# Patient Record
Sex: FEMALE | Race: WHITE | NOT HISPANIC OR LATINO | Employment: OTHER | URBAN - METROPOLITAN AREA
[De-identification: names, ages, dates, MRNs, and addresses within clinical notes are randomized per-mention and may not be internally consistent; named-entity substitution may affect disease eponyms.]

---

## 2023-06-19 ENCOUNTER — TRANSFERRED RECORDS (OUTPATIENT)
Dept: HEALTH INFORMATION MANAGEMENT | Facility: CLINIC | Age: 68
End: 2023-06-19

## 2024-01-25 ENCOUNTER — TELEPHONE (OUTPATIENT)
Dept: TRANSPLANT | Facility: CLINIC | Age: 69
End: 2024-01-25
Payer: COMMERCIAL

## 2024-01-25 ENCOUNTER — DOCUMENTATION ONLY (OUTPATIENT)
Dept: TRANSPLANT | Facility: CLINIC | Age: 69
End: 2024-01-25
Payer: COMMERCIAL

## 2024-01-25 NOTE — TELEPHONE ENCOUNTER
"Donor Intake Start:24Donor Intake Complete:24  Expiration Date:24  Gender:FemalePreferred Language:English  Full Name:Nadia Hart  Needed:[not answered]  Preferred Name:Nadia  Phone Number:[phone] Phone:  Contact Preference:[not answered]Best Contact Time:9am - 5pm  Emergency Contact:Radha Fofana Contact #:[phone]  Relationship to Contact:Contact is a friend  :[birth year]Age:68  Country:[facility]  Address:[address] [place] [address] [place]  [id number]  Height:5'3\"Weight:151lbs  BMI:26.7  Employment Status:RetiredHas PTO for donation?[not answered]  Occupation:[not answered]Requires Heavy Lifting?[not answered]  Education Level:Four Year DegreeMarital Status:  Exercise Routine:2-3/WeekHealth Insurance:  Yes  Blood Type:UnknownEthnicity/Race:White  Donor Type:Family Voucher Donor  Prefer Remote Donation:[not answered]  Physician:KAMI Elise [place], [place]  Motivation to donate:  My late  was the recipient of a living donor kidney and I would like to honor him and the generousity of the donor; I also know the need is great and I can do without 2 kidneys.  Living Donor Pre-Screening  [place] In [place]  Yes  Will Accept Blood Transfusions?  Yes  Has been Diagnosed with Kidney Disease?  No  Has had a Heart Attack?  No  Has Diabetes?  No  Has had Cancer?  No  Has had Kidney Stones?  No  Has ever been Pregnant?  Yes    - Is Currently Pregnant?  No    - Months Since Pregnancy?24+    - Is Currently Nursing?  No    - Gestational Diabetes?  No    - Hypertension during pregnancy?  Never  Is Planning on Pregnancy?  No  Is Taking Birth Control?  No  Has Used Tobacco  Yes    - Currently uses Tobacco?  No    - Will Stop for Surgery?N/A    - How Many Years:20    - Tobacco use (packs/cans) / Frequency:1 / Daily  Has HIV?  No  Is Currently Incarcerated?  No  Is Currently Residing in [place]  Yes  History Misc  Has " Allergies?  Yes  Allergy  penicillin, keflex  Has had Surgeries?  Yes  Surgery When  neck surgery July 11, 2023  tonsilectomy 60 years ago?  Takes Medication?  Yes  Medication Dose Frequency  citalopram 20 mg 1/day  pravastatin 20 mg 1/day  bupropion 150 mg 1/day  calcium, glucosamine, vitamins ? 1/day  Medical History  History of High BP?  Never  Has History Of CABG (bypass surgery)?  No  History of Blood Clots?  Never  History of Coronary Disease?  Never  Has Stents Implanted?  No  Has History of Chest Pain with Exercise?  No  Has History of Chest Pain at Other Times?  No  Results of Climbing 2 Flights of Stairs?No Problem  Has had Stress Test within Last Year?  No  Has had Stroke?  No  Has had Leg Bypass?  No  History of Lung Disease?  Never  History of COPD?  Never  History of TB?  Never    - Is TB Active?[not answered]  History of Pneumonia?  Never  Has Respiratory Issues?  No  Has Gastro Issues?  No  History of Gallstones?  Never  History of Pancreatitis?  Never  History of Liver Disease?  Never  History of Hepatitis B?  Never    - Is Hep B Active?[not answered]  History of Hepatitis C?  Never  History of Bleeding Problem?  Never  History of UTIs?  No  History of Kidney Damage?  Never  History of Proteinuria?  Never  History of Hematuria?  Never  History of Neuro Disease?  Never  History of Seizure?  Never  History of Lupus?  Never  History of Paralysis?  Never  History of Arthritis?  Never  History of Neuropathy?  Never  History of Depression?  Still being treated  History of Anxiety?  Never  History of Documented Psychiatric Illness?  Never  History of Fibroid Uterus?  Never  History of Endometriosis?  Never  History of Polycystic Ovaries?  Never  Has had Miscarriages?  No  Has had Abortions?  Yes    - # of Abortions:1  Has had Transfusions?  No  History of Obesity?  No  History of Fabry's Disease?  No  History of Sickle Cell Disease?  No  History of Sickle Cell Trait?  No  History of  Sarcoidosis?  No  History of Auto-Immune Disease  No  Has had Physical Exam?  Yes    - how many years ago:1  Has had Mammogram?  Yes    - how many years ago:1  Has had Pap Smear?  Yes    - how many years ago:3  Has had Colonoscopy?  Yes    - How Many Years Ago:6  Medical History Comments?Can't think of any just now. Thanks.  Living Donor Family Medical History  Anyone with kidney disease?  No  Anyone with liver disease?  No  Anyone with heart disease?  Yes    - which family members:Mother  Anyone with coronary artery disease?  No  Anyone with high blood pressure?  No  Anyone with blood disorder?  No  Anyone with cancer?  Yes    - which family members:maternal aunt  Anyone with kidney cancer?  No  Anyone with diabetes?  No  Is mother alive?  No  Mother's age?88  Mother's cause of death?congestive heart failure  Is father alive?  No  Father's age?98  Father's cause of death?sepsis  How many siblings?3  How many adult children?0  How many children under 18?0  Social History  Has Used Alcohol?  Yes    - currently uses alcohol:  No    - how much:4/Weekly  Has Abused Alcohol?  No  Has Used Drugs?  Yes  Drugs UsedLast UsedGot Treatment?  [id number] years  No     Has had legal issues w/ law enforcement?  No  Traveled over 100 miles from home in last year?  Yes    - Traveled Where?in state  Has had suicidal thoughts or attempts in the last five years?  No

## 2024-01-29 ENCOUNTER — TELEPHONE (OUTPATIENT)
Dept: TRANSPLANT | Facility: CLINIC | Age: 69
End: 2024-01-29
Payer: COMMERCIAL

## 2024-01-29 NOTE — TELEPHONE ENCOUNTER
Initial Independent Living Donor Advocate contact made with potential donor today.  I introduced myself and my role during the donation process, including:   VIKY ROLE   The federal government requires that all licensed transplant centers provide the living donor with an Independent Living Donor Advocate (VIKY).  I do not meet recipients or attend meetings that discuss their care or decision to transplant them. My role is separate to avoid any conflict of interest.  My role is to ensure:  1) your rights are protected;  2) you get all the information you need from the transplant team to make a fully informed decision whether to donate;   3) that living donation is in your best interest.   4) that you have the right to decide NOT to go forward with living donation at any time during this process.  I am available to you throughout the workup, during surgery phase and follow-up at home.     WORKUP & PRIVACY   Your identity and workup are not shared with the recipient at any time.   The recipient's insurance covers the medical expenses related to the donor evaluation and surgery.  However, it is important that you carry your own health insurance to address any medical issues that are found and are NOT related to living donation.  Additionally, age appropriate cancer screening (I.e. mammograms,  colonoscopies, etc) is required and would be through your insurance.  There is a psychosocial and medical donor workup that consists of testing to determine if you are healthy enough to donate. Workup tests include tissue typing/genetics, many blood draws, urine collection/ (kidney function testing), chest x-ray, EKG/other heart testing, CT scan. Age appropriate cancer screening is required and would be through your insurance. As you complete each step then you may move on to the next.  Workup can take as little or as long as you need and you can stop the process at any time. Transplant is a treatment option, not a cure. A kidney  from a living kidney donor can last 12-14 years.  Other treatment options are  donation and two types of dialysis.   This is major surgery and your estimated hospital stay is approximately 1-2 nights.  After surgery, there are driving and lifting restrictions - no driving for two weeks and no lifting over ten pounds for 8 weeks.  Donors are routinely off from work for 4 - 6 weeks after surgery, and potentially longer if they have a physical job.     If you anticipate lost wages due to donation, donor wage reimbursement options may be available to you and will be reviewed with you during the evaluation process. Donor Shield and NLDAC explained.  We reviewed the importance of completing follow-up labs and surveys at six months, 1 year and 2 years after donation to monitor kidney health and the impact donation has had on their life post donation.        QUESTIONS    Have you received the Welcome e-mail that includes copies of the informed consent, financial letter, information on donor shield and NLDAC from the transplant department? Yes.    Have you discussed with anyone your potential decision to donate?   Yes. Mentioned it to a few people. All supportive     Is anyone pressuring or coercing you to donate? No.    Have you discussed any financial arrangements with recipient around donating a kidney? No.    Are you aware that you can confidentially opt out at any time, up to and including day of donation? Yes.    At this time, would you like to proceed with the medical evaluation to see if you can be a kidney donor?  Yes.    If yes, I will make an appointment for your donor coordinator to reach out to you with next steps.     Contact information for VIKY's was provided Yes.    Lety Villarreal- [phone]  Bobbi Robles-  [phone]    Confirmed that she reviewed Informed consent document and all questions answered.  Reviewed that they will receive Docusign to obtain electronic signature for the following:  Informed consent, SRTR data, BRIAN for medical information, Auth for Electronic communication, Kidney for Life and will need their signed consent back before proceeding with evaluation.     Time frame for donation: open  Paired exchange was introduced  Yes.      MyChart was initiated  Yes.  CareEverywhere was initiated Yes.    VIKY NOTES: Nadia wants to be a NDD. Lives in [facility] with 2 cats.  passed away 3 years ago and had had a kidney transplant from a living donor (her cousin) and she wants to donate in his honor. She is retired. Has Medicare. Scheduled to talk to Antonieta SWANSONAbdoulaye On 2/6 at 9am.     Bobbi Robles, [facility], CCTSW   Independent Living Donor Advocate  [facility], [facility], [facility]  Direct: [phone]  E-Mail: [email or web address]      Duration of call 30 minutes

## 2024-02-06 ENCOUNTER — DOCUMENTATION ONLY (OUTPATIENT)
Dept: TRANSPLANT | Facility: CLINIC | Age: 69
End: 2024-02-06
Payer: COMMERCIAL

## 2024-02-06 ENCOUNTER — TELEPHONE (OUTPATIENT)
Dept: TRANSPLANT | Facility: CLINIC | Age: 69
End: 2024-02-06
Payer: COMMERCIAL

## 2024-02-06 DIAGNOSIS — Z00.5 TRANSPLANT DONOR EVALUATION: Primary | ICD-10-CM

## 2024-02-06 NOTE — TELEPHONE ENCOUNTER
Contacted Nadia Allan to introduce myself and my role, review of medical/surgical/family history and next steps.     Nadia Allan  is aware She can stop donor evaluation at any time.    Regular blood donor? Yes Last blood donation date years ago (Notified donor to avoid blood donation at this time to get accurate blood counts if going through evaluation)     Nadia Allan is a 69 year old female  ABO  ? at would like to learn more about NDD. Open to paired exchange: yes     Concerns from medical/surgical/family history: had neck surgery 6 months ago for pinched nerve    Current medications and NSAID use:     Allergies reviewed: penicillin, keflex    Legal issues w/ law enforcement: n    Reviewed any history of travel in endemic areas: [place], no  Strongyloides- [place], [place] and [place].  Trypanosoma cruzi ([place])- [place]  West Nile Virus- [place], [place], [place], [place] and [place]. (Included in WALT testing prior to donation)    Per our Phase 1 algorithm, does meet criteria to do preliminary testing. Social work screening no.      Verified that potential donor was provided the informed consent DocuSign and they are comfortable moving forward to living donor evaluation.    Reviewed evaluation testing: Iohexol, Lab work, CXR, EKG, Provider visits and functions, CT Angiogram.     Reviewed operations of selection committee and angio review meetings and the need for multidisciplinary input. Post-donation requirements include post-op appointment with your surgeon at 2 weeks after surgery, 6 week, 6 month, 1 year and 2 year lab tests.      I Reviewed NKR listing and transfer of care to KPD team if approved. Provided Vernell with NKR website to review.     I Briefly went over options if approved of NDD and voucher donation.     I would like to proceed with next steps: phase 1I     Encouraged sign up for Offerboxxhart and reviewed importance of watching teaching videos prior  to evaluation.    I Verified recipient status if NDD.    Donor timeline: no time line     Will send orders to scheduling team to set up for phase 1 testing.

## 2024-02-16 ENCOUNTER — TRANSFERRED RECORDS (OUTPATIENT)
Dept: HEALTH INFORMATION MANAGEMENT | Facility: CLINIC | Age: 69
End: 2024-02-16
Payer: COMMERCIAL

## 2024-02-22 ENCOUNTER — DOCUMENTATION ONLY (OUTPATIENT)
Dept: TRANSPLANT | Facility: CLINIC | Age: 69
End: 2024-02-22
Payer: COMMERCIAL

## 2024-02-22 NOTE — PROGRESS NOTES
Reviewed phase 1 testing. Labs look good but blood pressure elevated. Let Nadia know we will have her complete a 24hr blood pressure before deciding if she can come for evaluation. Nadia is in agreement with plan. 24hr blood pressure ordered.

## 2024-02-27 ENCOUNTER — TRANSFERRED RECORDS (OUTPATIENT)
Dept: HEALTH INFORMATION MANAGEMENT | Facility: CLINIC | Age: 69
End: 2024-02-27
Payer: COMMERCIAL

## 2024-03-04 DIAGNOSIS — Z00.5 TRANSPLANT DONOR EVALUATION: Primary | ICD-10-CM

## 2024-03-12 DIAGNOSIS — Z00.5 TRANSPLANT DONOR EVALUATION: Primary | ICD-10-CM

## 2024-03-12 RX ORDER — EPINEPHRINE 1 MG/ML
0.3 INJECTION, SOLUTION, CONCENTRATE INTRAVENOUS EVERY 5 MIN PRN
Status: CANCELLED | OUTPATIENT
Start: 2024-03-14

## 2024-03-12 RX ORDER — ALBUTEROL SULFATE 90 UG/1
1-2 AEROSOL, METERED RESPIRATORY (INHALATION)
Status: CANCELLED
Start: 2024-03-14

## 2024-03-12 RX ORDER — MEPERIDINE HYDROCHLORIDE 25 MG/ML
25 INJECTION INTRAMUSCULAR; INTRAVENOUS; SUBCUTANEOUS EVERY 30 MIN PRN
Status: CANCELLED | OUTPATIENT
Start: 2024-03-14

## 2024-03-12 RX ORDER — ALBUTEROL SULFATE 0.83 MG/ML
2.5 SOLUTION RESPIRATORY (INHALATION)
Status: CANCELLED | OUTPATIENT
Start: 2024-03-14

## 2024-03-12 RX ORDER — DIPHENHYDRAMINE HYDROCHLORIDE 50 MG/ML
50 INJECTION INTRAMUSCULAR; INTRAVENOUS
Status: CANCELLED
Start: 2024-03-14

## 2024-03-13 DIAGNOSIS — Z00.5 TRANSPLANT DONOR EVALUATION: ICD-10-CM

## 2024-03-13 LAB
A1 AG RBC QL: POSITIVE
ABO/RH(D): NORMAL
ANTIBODY SCREEN: NEGATIVE
SPECIMEN EXPIRATION DATE: NORMAL
SPECIMEN EXPIRATION DATE: NORMAL

## 2024-03-14 ENCOUNTER — HOSPITAL ENCOUNTER (OUTPATIENT)
Dept: CT IMAGING | Facility: CLINIC | Age: 69
Discharge: HOME | End: 2024-03-14
Attending: INTERNAL MEDICINE

## 2024-03-14 ENCOUNTER — ALLIED HEALTH/NURSE VISIT (OUTPATIENT)
Dept: TRANSPLANT | Facility: CLINIC | Age: 69
End: 2024-03-14
Attending: INTERNAL MEDICINE

## 2024-03-14 ENCOUNTER — OFFICE VISIT (OUTPATIENT)
Dept: TRANSPLANT | Facility: CLINIC | Age: 69
End: 2024-03-14
Attending: INTERNAL MEDICINE

## 2024-03-14 ENCOUNTER — LAB (OUTPATIENT)
Dept: LAB | Facility: CLINIC | Age: 69
End: 2024-03-14

## 2024-03-14 ENCOUNTER — OFFICE VISIT (OUTPATIENT)
Dept: INFUSION THERAPY | Facility: CLINIC | Age: 69
End: 2024-03-14
Attending: INTERNAL MEDICINE

## 2024-03-14 ENCOUNTER — LAB (OUTPATIENT)
Dept: LAB | Facility: CLINIC | Age: 69
End: 2024-03-14
Attending: INTERNAL MEDICINE

## 2024-03-14 ENCOUNTER — HOSPITAL ENCOUNTER (OUTPATIENT)
Dept: CARDIOLOGY | Facility: CLINIC | Age: 69
Discharge: HOME | End: 2024-03-14
Attending: INTERNAL MEDICINE

## 2024-03-14 ENCOUNTER — HOSPITAL ENCOUNTER (OUTPATIENT)
Dept: GENERAL RADIOLOGY | Facility: CLINIC | Age: 69
Discharge: HOME | End: 2024-03-14
Attending: INTERNAL MEDICINE

## 2024-03-14 VITALS
HEART RATE: 93 BPM | SYSTOLIC BLOOD PRESSURE: 102 MMHG | HEIGHT: 62 IN | WEIGHT: 148.15 LBS | OXYGEN SATURATION: 94 % | DIASTOLIC BLOOD PRESSURE: 68 MMHG | TEMPERATURE: 97.7 F | BODY MASS INDEX: 27.26 KG/M2

## 2024-03-14 VITALS
RESPIRATION RATE: 18 BRPM | DIASTOLIC BLOOD PRESSURE: 81 MMHG | TEMPERATURE: 97.8 F | SYSTOLIC BLOOD PRESSURE: 121 MMHG | HEART RATE: 89 BPM | WEIGHT: 148.1 LBS | OXYGEN SATURATION: 98 %

## 2024-03-14 DIAGNOSIS — Z00.5 TRANSPLANT DONOR EVALUATION: ICD-10-CM

## 2024-03-14 DIAGNOSIS — Z00.5 TRANSPLANT DONOR EVALUATION: Primary | ICD-10-CM

## 2024-03-14 DIAGNOSIS — M47.812 SPONDYLOSIS OF CERVICAL REGION WITHOUT MYELOPATHY OR RADICULOPATHY: ICD-10-CM

## 2024-03-14 DIAGNOSIS — F41.9 ANXIETY AND DEPRESSION: Primary | ICD-10-CM

## 2024-03-14 DIAGNOSIS — F32.A ANXIETY AND DEPRESSION: Primary | ICD-10-CM

## 2024-03-14 DIAGNOSIS — I05.1 RHEUMATIC MITRAL REGURGITATION: ICD-10-CM

## 2024-03-14 LAB
ABO/RH(D): NORMAL
ALBUMIN MFR UR ELPH: 9.9 MG/DL
ALBUMIN SERPL BCG-MCNC: 4.5 G/DL (ref 3.5–5.2)
ALBUMIN UR-MCNC: NEGATIVE MG/DL
ALP SERPL-CCNC: 83 U/L (ref 40–150)
ALT SERPL W P-5'-P-CCNC: 22 U/L (ref 0–50)
ANION GAP SERPL CALCULATED.3IONS-SCNC: 11 MMOL/L (ref 7–15)
APPEARANCE UR: CLEAR
APTT PPP: 31 SECONDS (ref 22–38)
AST SERPL W P-5'-P-CCNC: 29 U/L (ref 0–45)
ATRIAL RATE - MUSE: 77 BPM
BILIRUB SERPL-MCNC: 0.5 MG/DL
BILIRUB UR QL STRIP: NEGATIVE
BUN SERPL-MCNC: 18.9 MG/DL (ref 8–23)
CALCIUM SERPL-MCNC: 9.7 MG/DL (ref 8.8–10.2)
CHLORIDE SERPL-SCNC: 101 MMOL/L (ref 98–107)
CHOLEST SERPL-MCNC: 180 MG/DL
CMV IGG SERPL IA-ACNC: 9.2 U/ML
CMV IGG SERPL IA-ACNC: ABNORMAL
COLOR UR AUTO: YELLOW
CREAT SERPL-MCNC: 0.92 MG/DL (ref 0.51–0.95)
CREAT UR-MCNC: 101 MG/DL
CREAT UR-MCNC: 102 MG/DL
DEPRECATED HCO3 PLAS-SCNC: 24 MMOL/L (ref 22–29)
DIASTOLIC BLOOD PRESSURE - MUSE: NORMAL MMHG
EBV VCA IGG SER IA-ACNC: >750 U/ML
EBV VCA IGG SER IA-ACNC: POSITIVE
EGFRCR SERPLBLD CKD-EPI 2021: 66 ML/MIN/1.73M2
ERYTHROCYTE [DISTWIDTH] IN BLOOD BY AUTOMATED COUNT: 13.2 % (ref 10–15)
FASTING STATUS PATIENT QL REPORTED: YES
GLUCOSE SERPL-MCNC: 92 MG/DL (ref 70–99)
GLUCOSE UR STRIP-MCNC: NEGATIVE MG/DL
HBA1C MFR BLD: 5.6 %
HBV CORE AB SERPL QL IA: NONREACTIVE
HBV SURFACE AB SERPL IA-ACNC: 219 M[IU]/ML
HBV SURFACE AB SERPL IA-ACNC: REACTIVE M[IU]/ML
HCT VFR BLD AUTO: 43.2 % (ref 35–47)
HDLC SERPL-MCNC: 59 MG/DL
HGB BLD-MCNC: 14.8 G/DL (ref 11.7–15.7)
HGB UR QL STRIP: NEGATIVE
HIV 1+2 AB+HIV1 P24 AG SERPL QL IA: NONREACTIVE
INR PPP: 1.02 (ref 0.85–1.15)
INTERPRETATION ECG - MUSE: NORMAL
KETONES UR STRIP-MCNC: NEGATIVE MG/DL
LDLC SERPL CALC-MCNC: 105 MG/DL
LEUKOCYTE ESTERASE UR QL STRIP: ABNORMAL
MCH RBC QN AUTO: 29.7 PG (ref 26.5–33)
MCHC RBC AUTO-ENTMCNC: 34.3 G/DL (ref 31.5–36.5)
MCV RBC AUTO: 87 FL (ref 78–100)
MICROALBUMIN UR-MCNC: <12 MG/L
MICROALBUMIN/CREAT UR: NORMAL MG/G{CREAT}
MUCOUS THREADS #/AREA URNS LPF: PRESENT /LPF
NITRATE UR QL: NEGATIVE
NONHDLC SERPL-MCNC: 121 MG/DL
P AXIS - MUSE: 30 DEGREES
PH UR STRIP: 5.5 [PH] (ref 5–7)
PHOSPHATE SERPL-MCNC: 3.4 MG/DL (ref 2.5–4.5)
PLATELET # BLD AUTO: 260 10E3/UL (ref 150–450)
POTASSIUM SERPL-SCNC: 4.2 MMOL/L (ref 3.4–5.3)
PR INTERVAL - MUSE: 174 MS
PROT SERPL-MCNC: 7.5 G/DL (ref 6.4–8.3)
PROT/CREAT 24H UR: 0.1 MG/MG CR (ref 0–0.2)
QRS DURATION - MUSE: 82 MS
QT - MUSE: 380 MS
QTC - MUSE: 430 MS
R AXIS - MUSE: 25 DEGREES
RBC # BLD AUTO: 4.98 10E6/UL (ref 3.8–5.2)
RBC URINE: 1 /HPF
SODIUM SERPL-SCNC: 136 MMOL/L (ref 135–145)
SP GR UR STRIP: 1.02 (ref 1–1.03)
SPECIMEN EXPIRATION DATE: NORMAL
SQUAMOUS EPITHELIAL: 3 /HPF
SYSTOLIC BLOOD PRESSURE - MUSE: NORMAL MMHG
T AXIS - MUSE: 28 DEGREES
T PALLIDUM AB SER QL: NONREACTIVE
TRIGL SERPL-MCNC: 78 MG/DL
URATE SERPL-MCNC: 4.7 MG/DL (ref 2.4–5.7)
UROBILINOGEN UR STRIP-MCNC: NORMAL MG/DL
VENTRICULAR RATE- MUSE: 77 BPM
WBC # BLD AUTO: 6.4 10E3/UL (ref 4–11)
WBC URINE: 8 /HPF

## 2024-03-14 PROCEDURE — 250N000011 HC RX IP 250 OP 636: Performed by: INTERNAL MEDICINE

## 2024-03-14 PROCEDURE — 86706 HEP B SURFACE ANTIBODY: CPT | Performed by: INTERNAL MEDICINE

## 2024-03-14 PROCEDURE — 36415 COLL VENOUS BLD VENIPUNCTURE: CPT

## 2024-03-14 PROCEDURE — 93325 DOPPLER ECHO COLOR FLOW MAPG: CPT | Mod: 26 | Performed by: INTERNAL MEDICINE

## 2024-03-14 PROCEDURE — 99207 PR NO CHARGE COORDINATED CARE PS: CPT

## 2024-03-14 PROCEDURE — 93321 DOPPLER ECHO F-UP/LMTD STD: CPT | Mod: 26 | Performed by: INTERNAL MEDICINE

## 2024-03-14 PROCEDURE — 82542 COL CHROMOTOGRAPHY QUAL/QUAN: CPT | Performed by: DIETITIAN, REGISTERED

## 2024-03-14 PROCEDURE — 86481 TB AG RESPONSE T-CELL SUSP: CPT | Performed by: INTERNAL MEDICINE

## 2024-03-14 PROCEDURE — 86780 TREPONEMA PALLIDUM: CPT | Performed by: INTERNAL MEDICINE

## 2024-03-14 PROCEDURE — 86704 HEP B CORE ANTIBODY TOTAL: CPT | Performed by: INTERNAL MEDICINE

## 2024-03-14 PROCEDURE — 84100 ASSAY OF PHOSPHORUS: CPT

## 2024-03-14 PROCEDURE — 84156 ASSAY OF PROTEIN URINE: CPT

## 2024-03-14 PROCEDURE — 71046 X-RAY EXAM CHEST 2 VIEWS: CPT | Mod: 26 | Performed by: RADIOLOGY

## 2024-03-14 PROCEDURE — 83036 HEMOGLOBIN GLYCOSYLATED A1C: CPT | Performed by: INTERNAL MEDICINE

## 2024-03-14 PROCEDURE — 36415 COLL VENOUS BLD VENIPUNCTURE: CPT | Performed by: TRANSPLANT SURGERY

## 2024-03-14 PROCEDURE — 81378 HLA I & II TYPING HR: CPT

## 2024-03-14 PROCEDURE — 80061 LIPID PANEL: CPT

## 2024-03-14 PROCEDURE — 36415 COLL VENOUS BLD VENIPUNCTURE: CPT | Performed by: DIETITIAN, REGISTERED

## 2024-03-14 PROCEDURE — 99214 OFFICE O/P EST MOD 30 MIN: CPT | Performed by: TRANSPLANT SURGERY

## 2024-03-14 PROCEDURE — 85730 THROMBOPLASTIN TIME PARTIAL: CPT

## 2024-03-14 PROCEDURE — 86644 CMV ANTIBODY: CPT | Performed by: INTERNAL MEDICINE

## 2024-03-14 PROCEDURE — 74175 CTA ABDOMEN W/CONTRAST: CPT

## 2024-03-14 PROCEDURE — 85027 COMPLETE CBC AUTOMATED: CPT

## 2024-03-14 PROCEDURE — 86665 EPSTEIN-BARR CAPSID VCA: CPT | Performed by: INTERNAL MEDICINE

## 2024-03-14 PROCEDURE — 86900 BLOOD TYPING SEROLOGIC ABO: CPT

## 2024-03-14 PROCEDURE — 81001 URINALYSIS AUTO W/SCOPE: CPT

## 2024-03-14 PROCEDURE — 99205 OFFICE O/P NEW HI 60 MIN: CPT | Performed by: INTERNAL MEDICINE

## 2024-03-14 PROCEDURE — 71046 X-RAY EXAM CHEST 2 VIEWS: CPT

## 2024-03-14 PROCEDURE — 93018 CV STRESS TEST I&R ONLY: CPT | Performed by: INTERNAL MEDICINE

## 2024-03-14 PROCEDURE — 255N000002 HC RX 255 OP 636: Performed by: INTERNAL MEDICINE

## 2024-03-14 PROCEDURE — 86850 RBC ANTIBODY SCREEN: CPT | Performed by: INTERNAL MEDICINE

## 2024-03-14 PROCEDURE — 82570 ASSAY OF URINE CREATININE: CPT | Performed by: INTERNAL MEDICINE

## 2024-03-14 PROCEDURE — 84155 ASSAY OF PROTEIN SERUM: CPT

## 2024-03-14 PROCEDURE — 86905 BLOOD TYPING RBC ANTIGENS: CPT | Performed by: INTERNAL MEDICINE

## 2024-03-14 PROCEDURE — G0463 HOSPITAL OUTPT CLINIC VISIT: HCPCS | Mod: 25 | Performed by: TRANSPLANT SURGERY

## 2024-03-14 PROCEDURE — 86803 HEPATITIS C AB TEST: CPT | Performed by: INTERNAL MEDICINE

## 2024-03-14 PROCEDURE — 999N000208 ECHO STRESS ECHOCARDIOGRAM

## 2024-03-14 PROCEDURE — 82043 UR ALBUMIN QUANTITATIVE: CPT | Performed by: INTERNAL MEDICINE

## 2024-03-14 PROCEDURE — 86788 WEST NILE VIRUS AB IGM: CPT

## 2024-03-14 PROCEDURE — 93016 CV STRESS TEST SUPVJ ONLY: CPT | Performed by: INTERNAL MEDICINE

## 2024-03-14 PROCEDURE — 84550 ASSAY OF BLOOD/URIC ACID: CPT

## 2024-03-14 PROCEDURE — 82374 ASSAY BLOOD CARBON DIOXIDE: CPT

## 2024-03-14 PROCEDURE — 87340 HEPATITIS B SURFACE AG IA: CPT | Performed by: INTERNAL MEDICINE

## 2024-03-14 PROCEDURE — 85610 PROTHROMBIN TIME: CPT

## 2024-03-14 PROCEDURE — 93350 STRESS TTE ONLY: CPT | Mod: 26 | Performed by: INTERNAL MEDICINE

## 2024-03-14 PROCEDURE — 74175 CTA ABDOMEN W/CONTRAST: CPT | Mod: 26 | Performed by: RADIOLOGY

## 2024-03-14 RX ORDER — DIPHENHYDRAMINE HYDROCHLORIDE 50 MG/ML
50 INJECTION INTRAMUSCULAR; INTRAVENOUS
Status: CANCELLED
Start: 2024-03-14

## 2024-03-14 RX ORDER — EPINEPHRINE 1 MG/ML
0.3 INJECTION, SOLUTION INTRAMUSCULAR; SUBCUTANEOUS EVERY 5 MIN PRN
Status: CANCELLED | OUTPATIENT
Start: 2024-03-14

## 2024-03-14 RX ORDER — IOPAMIDOL 755 MG/ML
90 INJECTION, SOLUTION INTRAVASCULAR ONCE
Status: COMPLETED | OUTPATIENT
Start: 2024-03-14 | End: 2024-03-14

## 2024-03-14 RX ORDER — CITALOPRAM HYDROBROMIDE 40 MG/1
40 TABLET ORAL EVERY MORNING
COMMUNITY
Start: 2023-05-16

## 2024-03-14 RX ORDER — IBUPROFEN 200 MG
200 TABLET ORAL EVERY 6 HOURS PRN
COMMUNITY
Start: 2023-07-18

## 2024-03-14 RX ORDER — ALBUTEROL SULFATE 0.83 MG/ML
2.5 SOLUTION RESPIRATORY (INHALATION)
Status: CANCELLED | OUTPATIENT
Start: 2024-03-14

## 2024-03-14 RX ORDER — IBUPROFEN 200 MG
500 CAPSULE ORAL DAILY
COMMUNITY

## 2024-03-14 RX ORDER — ALBUTEROL SULFATE 90 UG/1
1-2 AEROSOL, METERED RESPIRATORY (INHALATION)
Status: CANCELLED
Start: 2024-03-14

## 2024-03-14 RX ORDER — MEPERIDINE HYDROCHLORIDE 25 MG/ML
25 INJECTION INTRAMUSCULAR; INTRAVENOUS; SUBCUTANEOUS EVERY 30 MIN PRN
Status: CANCELLED | OUTPATIENT
Start: 2024-03-14

## 2024-03-14 RX ORDER — METHYLPREDNISOLONE SODIUM SUCCINATE 125 MG/2ML
125 INJECTION, POWDER, LYOPHILIZED, FOR SOLUTION INTRAMUSCULAR; INTRAVENOUS
Status: CANCELLED
Start: 2024-03-14

## 2024-03-14 RX ORDER — HYDROCORTISONE 2.5 %
CREAM (GRAM) TOPICAL PRN
COMMUNITY
Start: 2023-10-23

## 2024-03-14 RX ORDER — BUPROPION HYDROCHLORIDE 150 MG/1
450 TABLET ORAL EVERY MORNING
COMMUNITY
Start: 2024-02-16

## 2024-03-14 RX ORDER — PRAVASTATIN SODIUM 20 MG
20 TABLET ORAL DAILY
COMMUNITY
Start: 2024-02-16

## 2024-03-14 RX ADMIN — IOHEXOL 4 ML: 350 INJECTION, SOLUTION INTRAVENOUS at 09:16

## 2024-03-14 RX ADMIN — PERFLUTREN 5 ML: 6.52 INJECTION, SUSPENSION INTRAVENOUS at 15:08

## 2024-03-14 RX ADMIN — IOPAMIDOL 90 ML: 755 INJECTION, SOLUTION INTRAVENOUS at 14:22

## 2024-03-14 ASSESSMENT — PAIN SCALES - GENERAL: PAINLEVEL: NO PAIN (0)

## 2024-03-14 NOTE — LETTER
3/14/2024         RE: Nadia Allan  [address]  [place] [address]        Dear Colleague,    Thank you for referring your patient, Nadia Allan, to the [facility] TRANSPLANT [facility]. Please see a copy of my visit note below.    TRANSPLANT NEPHROLOGY DONOR EVALUATION    Assessment and Plan:  # Prospective Kidney Transplant Donor: Patient with some issues that need to be addressed prior to donation. Patient's blood pressure is acceptable at this visit, kidney function appears to be possibly low with Iohexol pending, and urinalysis is abnormal.    # Low GFR: Patient with low estimated GFR and also calculated low GFR via Iohexol, which would preclude her from donating.    # Abnormal Cardiac Stress Test: Patient with abnormal positive cardiac stress echo, as well as previous cardiac echo that suggested HFpEF with grade I diastolic dysfunction.   - Recommend Cardiology referral and probably coronary angiogram.    # Chronic Back Pain with Radiculopathy and Back Surgery: Patient with both cervical and lumbar back pain with more recent bilateral finger paraesthesia.  Imaging showed cervical spondylosis with central stenosis, now s/p C2-3 laminectomy.  She reports paraesthesia is improved, but no resolved.   - Should patient be a donor candidate, would discuss with Neurosurgery whether positioning during donor nephrectomy might be a concern.    # Asymptomatic Pyuria: Patient with 8 WBC on urinalysis, but denies any UTI signs or symptoms.   - Would repeat UA.    Discussed the risks of donating a kidney, including the surgical risk and the possible risks of living with one kidney.    Education about expected post-donation kidney function and how chronic kidney disease (CKD) and end stage kidney disease (ESKD) might potentially impact the donor in the future, include, but not limited to:       - On average, donors will have 25-35% permanent loss of kidney function at donation.       - Baseline risk  of ESKD may slightly exceed that of members of the general        population with the same demographic profile.       - Donor risks must be interpreted in light of known epidemiology of both CKD or         ESKD, such as that CKD generally develops in midlife (40-50 years old) and ESKD         generally develops after age 60.       - Medical evaluation of young potential donors cannot predict lifetime risk of CKD or         ESKD.       - Donors may be at higher risk for CKD if they sustain damage to the remaining         kidney.       - Development of CKD and progression of ESKD may be more rapid with only 1         kidney.       - Some type of kidney replacement therapy, either kidney transplant or dialysis, is         required when reaching ESKD.    Potential medical or surgical risks include, but not limited to:       - Death.       - Scars, pain, fatigue, and other consequences typical of any surgical procedure.       - Decreased kidney function.       - Abdominal or bowel symptoms, such as bloating and nausea, and developing         bowel obstruction.       - Kidney failure (ESKD) and the need for a kidney transplant or dialysis for the donor.       - Impact of obesity, hypertension, or other donor-specific medical conditions on         morbidity and mortality of the potential donor.    Patients overall evaluation will be discussed with the transplant team and a final recommendation on the patients' suitability to be a kidney transplant donor will be made at that time.    Consult:  Nadia Allan was seen in consultation at the request of Dr. Jose De Jesus Miller for evaluation as a potential kidney transplant donor.    Reason for Visit:  Nadia Allan is a 69 year old female who presents for a kidney donor evaluation.  Patient would like to be a non-directed donor.    Present Condition and Donor-Related Medical History:    Patient reports her  had a kidney transplant in 2008 and did well for many  years, for which she is grateful.  Her  has since , but she wants to give back and be a non directed kidney donor.    Energy level is okay, but it comes and goes at times and she can have a hard time getting motivated.  She is active and does get some exercise.  Denies any chest pain or shortness of breath with exertion.  Appetite is good and no recent weight change.  No nausea, vomiting or diarrhea.  No fever, sweats or chills.  No leg swelling.  No pain or burning with urination.    She does have a h/o bilateral finger numbness.  This was worked up with imaging showing cervical spondylosis with central stenosis.  She is s/p C2-3 laminectomy 2023 with improvement in her numbness, but not resolved.    Patient also has a h/o rheumatic fever and now with mitral valve regurgitation.  This has reportedly been stable on cardiac echo, although her last echo 2023 did show grade I diastolic dysfunction.         Kidney Disease Hx:       h/o Kidney Problems: No   Family h/o Genetic Kidney Disease: No       h/o Hypertension: No      Usual Blood Pressure: Not checked       h/o Protein in Urine: No    h/o Blood in Urine: No       h/o Kidney Stones: No     h/o Kidney Injury: No       h/o Recurrent UTI: No   h/o Genitourinary Problems: No       h/o Chronic NSAID Use: No         Other Medical Hx:       h/o Diabetes: No             h/o Gastrointestinal, Pancreas or Liver Problems: No       h/o Lung or Heart Problems: Yes: H/o mitral valve regurgitation       h/o Hematologic Problems: No  h/o Bleeding or Clotting Problems: No       h/o Cancer: No       h/o Infection Problems: No       H/o Gestational DM: Not applicable      H/o Gestational HTN: Not applicable     H/o Preeclampsia: Not applicable         Skin Cancer Risk:       h/o more than 50 moles: No       h/o extensive sun exposure: No       h/o melanoma: No       Family h/o melanoma: No         Mental Health Assessment:       h/o Depression: Yes: Stable with  management by PCP       h/o Psychiatric Illness: No       h/o Suicidal Attempt(s): No    COVID Status:  Vaccination Up To Date: Yes  H/o COVID Infection: Yes; with no residual symptoms    Review Of Systems:   A comprehensive review of systems was obtained and negative, except as noted in the HPI or PMH.    Past Medical History:   History was taken from the patient as noted below.  Past Medical History:   Diagnosis Date     (HFpEF) heart failure with preserved ejection fraction (H)      Anxiety and depression      Arthritis      Chronic cough     Felt secondary to laryngeal/pharyngeal reflux     Dyslipidemia      Insomnia      Lumbar spondylosis      Mitral valve regurgitation      Osteopenia      Rheumatic fever      Spondylosis of cervical region without myelopathy or radiculopathy     Bilateral parasthesia in fingers, s/p C2-3 laminectomy       Past Social History:   Past Surgical History:   Procedure Laterality Date     BUNIONECTOMY Right      CERVICAL LAMINECTOMY      C2-3     FACELIFT       TONSILLECTOMY       WISDOM TOOTH EXTRACTION       Personal or family history of anesthesia problems: No    Family History:   Family History   Problem Relation Age of Onset     Coronary Artery Disease Mother      Cerebrovascular Disease Mother      Crohn's Disease Brother      Breast Cancer Maternal Aunt           Specific Family History in First Degree Relatives:       FH of Kidney Dz: No  FH of Diabetes: No       FH of Hypertension: No FH of CAD: Yes        FH of Cancer: No  FH of Kidney Cancer: No    Personal History:   Social History     Socioeconomic History     Marital status:      Spouse name: Not on file     Number of children: 0     Years of education: Not on file     Highest education level: Not on file   Occupational History     Not on file   Tobacco Use     Smoking status: Never     Smokeless tobacco: Never   Substance and Sexual Activity     Alcohol use: Yes     Alcohol/week: 1.0 standard drink of alcohol      Types: 1 Glasses of wine per week     Comment: 1 glass of wine     Drug use: Not Currently     Sexual activity: Not on file   Other Topics Concern     Not on file   Social History Narrative     Not on file     Social Determinants of Health     Financial Resource Strain: Not on file   Food Insecurity: Not on file   Transportation Needs: Not on file   Physical Activity: Not on file   Stress: Not on file   Social Connections: Not on file   Interpersonal Safety: Not on file   Housing Stability: Not on file          Specific Social History:       Health Insurance Status: Yes       Employment Status: Retired  Occupation: [employer]                       Living Arrangements: lives alone       Social Support: Yes       Presence of increased risk for disease transmission behaviors as defined by PHS guidelines: No        Allergies:  Allergies   Allergen Reactions     Keflex [Cephalexin] Unknown     Penicillins Unknown       Medications:  Current Outpatient Medications   Medication Sig     buPROPion (WELLBUTRIN XL) 150 MG 24 hr tablet Take 450 mg by mouth every morning     calcium carbonate 500 mg, elemental, (OSCAL 500) 1250 (500 Ca) MG TABS tablet Take 500 mg by mouth daily     citalopram (CELEXA) 40 MG tablet Take 40 mg by mouth every morning     Glucos-Chondroit-Hyaluron-MSM (GLUCOSAMINE CHONDROITIN JOINT PO) Take 1 tablet by mouth 2 times daily     hydrocortisone 2.5 % cream Apply topically as needed for rash     ibuprofen (ADVIL/MOTRIN) 200 MG tablet Take 200 mg by mouth every 6 hours as needed     Multiple Vitamin (MULTI VITAMIN DAILY PO) Take 1 tablet by mouth daily     pravastatin (PRAVACHOL) 20 MG tablet Take 20 mg by mouth daily     No current facility-administered medications for this visit.     There are no discontinued medications.      Vitals:      3/14/2024     9:34 AM 3/14/2024     9:35 AM 3/14/2024     9:36 AM   Vital Signs   Systolic 105 95 102   Diastolic 71 65 68       Exam:   GENERAL APPEARANCE: alert  and no distress  HENT: mouth without ulcers or lesions  RESP: lungs clear to auscultation - no rales, rhonchi or wheezes  CV: regular rhythm, normal rate, no rub, 2-6 systolic murmur and split S1S2  EDEMA: no LE edema bilaterally  ABDOMEN: soft, nondistended, nontender, bowel sounds normal  MS: extremities normal - no gross deformities noted, no evidence of inflammation in joints, no muscle tenderness  SKIN: no rash    Results:   Labs and imaging were ordered for this visit and reviewed by me.  Recent Results (from the past 336 hour(s))   EKG 12-lead complete w/read - Clinics    Collection Time: 03/14/24  7:28 AM   Result Value Ref Range    Systolic Blood Pressure  mmHg    Diastolic Blood Pressure  mmHg    Ventricular Rate 77 BPM    Atrial Rate 77 BPM    NC Interval 174 ms    QRS Duration 82 ms     ms    QTc 430 ms    P Axis 30 degrees    R AXIS 25 degrees    T Axis 28 degrees    Interpretation ECG       Sinus rhythm  Normal ECG  No previous ECGs available     EBV Capsid Antibody IgM    Collection Time: 03/14/24  8:53 AM   Result Value Ref Range    EBV Capsid Liliya IgM Instrument Value <10.0 <36.0 U/mL    EBV Capsid Antibody IgM No detectable antibody. No detectable antibody.   EBV Capsid Antibody IgG    Collection Time: 03/14/24  8:53 AM   Result Value Ref Range    EBV Capsid Liliya IgG Instrument Value >750.0 (H) <18.0 U/mL    EBV Capsid Antibody IgG Positive (A) No detectable antibody.   CMV Antibody IgG    Collection Time: 03/14/24  8:53 AM   Result Value Ref Range    CMV Liliya IgG Instrument Value 9.20 (H) <0.60 U/mL    CMV Antibody IgG Positive, suggests recent or past exposure. (A) No detectable antibody.    ABO Subtyping [[id number]]    Collection Time: 03/14/24  8:53 AM   Result Value Ref Range    A1 Antigen Type Positive     SPECIMEN EXPIRATION DATE [id number]    [place] Virus Antibody IgG IgM    Collection Time: 03/14/24  8:53 AM   Result Value Ref Range    [place] IgG Serum 0.52 <=1.29 IV    [place] IgM Serum 0.00 <=0.89 IV   Treponema Abs w Reflex to RPR and Titer    Collection Time: 03/14/24  8:53 AM   Result Value Ref Range    Treponema Antibody Total Nonreactive Nonreactive   HIV Antigen Antibody Combo Pretransplant Cascade    Collection Time: 03/14/24  8:53 AM   Result Value Ref Range    HIV Antigen Antibody Combo Pretransplant Nonreactive Nonreactive   Hepatitis C antibody    Collection Time: 03/14/24  8:53 AM   Result Value Ref Range    Hepatitis C Antibody Nonreactive Nonreactive   Hepatitis B surface antigen    Collection Time: 03/14/24  8:53 AM   Result Value Ref Range    Hepatitis B Surface Antigen Nonreactive Nonreactive   Hepatitis B Surface Antibody    Collection Time: 03/14/24  8:53 AM   Result Value Ref Range    Hepatitis B Surface Antibody Reactive     Hepatitis B Surface Antibody Instrument Value 219.00 <8.5 m[IU]/mL   Hepatitis B core antibody    Collection Time: 03/14/24  8:53 AM   Result Value Ref Range    Hepatitis B Core Antibody Total Nonreactive Nonreactive   Protein  random urine    Collection Time: 03/14/24  8:53 AM   Result Value Ref Range    Total Protein Urine mg/dL 9.9   mg/dL    Total Protein Urine mg/mg Creat 0.10 0.00 - 0.20 mg/mg Cr    Creatinine Urine mg/dL 101.0 mg/dL   Albumin Random Urine Quantitative with Creat Ratio    Collection Time: 03/14/24  8:53 AM   Result Value Ref Range    Creatinine Urine mg/dL 102.0 mg/dL    Albumin Urine mg/L <12.0 mg/L    Albumin Urine mg/g Cr     Routine UA with microscopic    Collection Time: 03/14/24  8:53 AM   Result Value Ref Range    Color Urine Yellow Colorless, Straw, Light Yellow, Yellow    Appearance Urine Clear Clear    Glucose Urine Negative Negative mg/dL    Bilirubin Urine Negative Negative    Ketones Urine Negative Negative mg/dL    Specific Gravity Urine 1.018 1.003 - 1.035    Blood Urine Negative Negative    pH Urine 5.5 5.0 - 7.0    Protein Albumin Urine Negative Negative mg/dL    Urobilinogen Urine Normal Normal, 2.0  mg/dL    Nitrite Urine Negative Negative    Leukocyte Esterase Urine Small (A) Negative    Mucus Urine Present (A) None Seen /LPF    RBC Urine 1 <=2 /HPF    WBC Urine 8 (H) <=5 /HPF    Squamous Epithelials Urine 3 (H) <=1 /HPF   CBC with platelets    Collection Time: 03/14/24  8:53 AM   Result Value Ref Range    WBC Count 6.4 4.0 - 11.0 10e3/uL    RBC Count 4.98 3.80 - 5.20 10e6/uL    Hemoglobin 14.8 11.7 - 15.7 g/dL    Hematocrit 43.2 35.0 - 47.0 %    MCV 87 78 - 100 fL    MCH 29.7 26.5 - 33.0 pg    MCHC 34.3 31.5 - 36.5 g/dL    RDW 13.2 10.0 - 15.0 %    Platelet Count 260 150 - 450 10e3/uL   Partial thromboplastin time    Collection Time: 03/14/24  8:53 AM   Result Value Ref Range    aPTT 31 22 - 38 Seconds   INR    Collection Time: 03/14/24  8:53 AM   Result Value Ref Range    INR 1.02 0.85 - 1.15   Hemoglobin A1c    Collection Time: 03/14/24  8:53 AM   Result Value Ref Range    Hemoglobin A1C 5.6 <5.7 %   Phosphorus    Collection Time: 03/14/24  8:53 AM   Result Value Ref Range    Phosphorus 3.4 2.5 - 4.5 mg/dL   Uric acid    Collection Time: 03/14/24  8:53 AM   Result Value Ref Range    Uric Acid 4.7 2.4 - 5.7 mg/dL   Lipid Profile    Collection Time: 03/14/24  8:53 AM   Result Value Ref Range    Cholesterol 180 <200 mg/dL    Triglycerides 78 <150 mg/dL    Direct Measure HDL 59 >=50 mg/dL    LDL Cholesterol Calculated 105 (H) <=100 mg/dL    Non HDL Cholesterol 121 <130 mg/dL    Patient Fasting > 8hrs? Yes    Comprehensive metabolic panel    Collection Time: 03/14/24  8:53 AM   Result Value Ref Range    Sodium 136 135 - 145 mmol/L    Potassium 4.2 3.4 - 5.3 mmol/L    Carbon Dioxide (CO2) 24 22 - 29 mmol/L    Anion Gap 11 7 - 15 mmol/L    Urea Nitrogen 18.9 8.0 - 23.0 mg/dL    Creatinine 0.92 0.51 - 0.95 mg/dL    GFR Estimate 66 >60 mL/min/1.73m2    Calcium 9.7 8.8 - 10.2 mg/dL    Chloride 101 98 - 107 mmol/L    Glucose 92 70 - 99 mg/dL    Alkaline Phosphatase 83 40 - 150 U/L    AST 29 0 - 45 U/L    ALT 22 0 - 50  U/L    Protein Total 7.5 6.4 - 8.3 g/dL    Albumin 4.5 3.5 - 5.2 g/dL    Bilirubin Total 0.5 <=1.2 mg/dL   Quantiferon TB Gold Plus Grey Tube    Collection Time: 03/14/24  8:53 AM    Specimen: Peripheral IV; Blood   Result Value Ref Range    Quantiferon Nil Tube 0.04 IU/mL   Quantiferon TB Gold Plus Green Tube    Collection Time: 03/14/24  8:53 AM    Specimen: Peripheral IV; Blood   Result Value Ref Range    Quantiferon TB1 Tube 0.03 IU/mL   Quantiferon TB Gold Plus Yellow Tube    Collection Time: 03/14/24  8:53 AM    Specimen: Peripheral IV; Blood   Result Value Ref Range    Quantiferon TB2 Tube 0.03    Quantiferon TB Gold Plus Purple Tube    Collection Time: 03/14/24  8:53 AM    Specimen: Peripheral IV; Blood   Result Value Ref Range    Quantiferon Mitogen 10.00 IU/mL   Adult Type and Screen    Collection Time: 03/14/24  8:53 AM   Result Value Ref Range    ABO/RH(D) A POS     Antibody Screen Negative Negative    SPECIMEN EXPIRATION DATE [id number]    Quantiferon TB Gold Plus    Collection Time: 03/14/24  8:53 AM    Specimen: Peripheral IV; Blood   Result Value Ref Range    Quantiferon-TB Gold Plus Negative Negative    TB1 Ag minus Nil Value -0.01 IU/mL    TB2 Ag minus Nil Value -0.01 IU/mL    Mitogen minus Nil Result 9.96 IU/mL    Nil Result 0.04 IU/mL   ABO and Rh 2nd type and screen required    Collection Time: 03/14/24  9:11 AM   Result Value Ref Range    ABO/RH(D) A POS     SPECIMEN EXPIRATION DATE [id number]    HLA-ABC Typing High Resolution    Collection Time: 03/14/24 10:30 AM   Result Value Ref Range    ABTEST METHOD NGS     hiresA-1 A*02:01     hiresA-1Equiv 2     hiresA-2 A*26:01     hiresA-2Equiv 26     hiresB-1 B*07:02     hiresB-1Equiv 7     hiresB-2 B*40:01     hiresB-2Equiv 60     hiresC-1 C*03:04     hiresC-1Equiv 10     hiresC-2 C*07:02     hiresC-2Equiv 7     hiresBw-1 Bw*6    HLA-DR Typing High Resolution    Collection Time: 03/14/24 10:30 AM   Result Value Ref Range    Ruy ROSE      hiresDPA1-1 DPA1*01:03     hiresDPB1-1 DPB1*03:01     hiresDQA1-1 DQA1*01:02     hiresDQA1-2 DQA1*02:01     hiresDQB1-1 DQB1*02:01     hiresDQB1-1Equiv 2     hiresDQB1-2 DQB1*06:04     hiresDQB1-2Equiv 6     hiresDRB1-1 DRB1*07:01     hiresDRB1-1Equiv 7     hiresDRB1-2 DRB1*13:02     hiresDRB1-2Equiv 13     hiresDRB3-1 DRB3*03:01     hiresDRB3-1Equiv 52     hiresDRB4-2 DRB4*01:03     hiresDRB4-2Equiv 53    Iohexol    Collection Time: 03/14/24 11:17 AM   Result Value Ref Range    Iohexol Body Surface Area 1.735 m2    Iohexol Raw Clearance 69 mL/min    Iohexol Std Clearance 69 /1.73 m2    Iohexol Time 1 11.09 mg/dL    Iohexol Time 2 5.97 mg/dL   Iohexol Timed Collection 2    Collection Time: 03/14/24  1:17 PM   Result Value Ref Range    Iohexol Time 2 5.97 mg/dL             Again, thank you for allowing me to participate in the care of your patient.        Sincerely,        Edinson Joe MD   Intermediate Repair Preamble Text (Leave Blank If You Do Not Want): Undermining was performed with blunt dissection.

## 2024-03-14 NOTE — NURSING NOTE
"Chief Complaint   Patient presents with    Blood Draw     Labs drawn via PIV placed by RN. VS taken.     Port accessed with 20 gauge, 3/4\" power needle by RN, labs collected, line flushed with saline and heparin.  Vitals taken. Pt checked in for appointment(s).     Ce Simon RN    "

## 2024-03-14 NOTE — NURSING NOTE
"Chief Complaint   Patient presents with    Transplant Donor Evaluation     Kidney donor evaluation       BP 1: 106/69  BP 2: 105/71  BP 3: 95/65    /68   Pulse 93   Temp 97.7  F (36.5  C) (Oral)   Ht 1.58 m (5' 2.21\")   Wt 67.2 kg (148 lb 2.4 oz)   SpO2 94%   BMI 26.92 kg/m      Rei Rosales RN on 3/14/2024 at 9:34 AM   "

## 2024-03-14 NOTE — PROGRESS NOTES
Saw Nadia in clinic on 3/14/24 for Living Kidney Donor Evaluation.     Nadia is interested in donation as and NDD.    I provided a folder which included copies of the following:    Living Kidney Donor Evaluation Consent  Paired Exchange Consent  Donor Shield Pamphlet  Living Donor Collective Study information  Kidney for Life pamphlet  Kidney Donors are Heroes! Study synopsis  Most current SRTR data.      I also provided a parking pass and food voucher.      I reviewed the Living Kidney Donor Evaluation Consent, dated 7-6-2020 and Paired Exchange/ NDD consent dated 9-.  I answered any question.    Evaluation Notes:  Internal tissue typing collected and sent   Will need Echo  Cards consult-under own insurance

## 2024-03-14 NOTE — LETTER
3/14/2024         RE: Nadia Allan  [address] [place] [address]        Dear Colleague,    Thank you for referring your patient, Nadia Allan, to the [facility] TRANSPLANT [facility]. Please see a copy of my visit note below.    [facility] SOLID ORGAN TRANSPLANT  OUTPATIENT MNT: KIDNEY DONOR EVALUATION     Current BMI: 26.9 (HT 62.2 in,  lbs/67 kg)    8 Year Estimated Risk of T2DM  </= 3%     TIME SPENT: 15 minutes  VISIT TYPE: Initial  REFERRING PHYSICIAN: Angela   PT ACCOMPANIED BY: self     NUTRITION ASSESSMENT  H/o kidney stones: none   Parental h/o DM: none  H/o GDM or HTN in pregnancy (if applicable): no    Vitamins, Supplements, Pertinent Meds: MVI, vit B12  Herbal Medicines/Supplements: none   Protein Supplement: Premier Protein (30 g) most days    Weight hx: down a bit lately w/ nutrisystem, otherwise fairly stable wt     PHYSICAL ACTIVITY   No routine   Has elliptical & lifts weights- sometimes does these things     DIET RECALL- eating 3 small meals + 2-3 snacks/day  Pt started Nutrisystem program ~1 mo ago. She has not done this before. She wanted to try it for the convenience/ease and not having to think about meal prep  Breakfast Waffles, pancakes, sausage egg muffin, oatmeal; previously would have raisin bran for BF   Lunch Pizza; previously would skip lunch   Dinner Nutrisystem meal (salmon w/ broccoli); previously would make a salad    Snacks Apple, nutrisystem bars; not a lot junk food even in the past    Beverages Coffee w/ milk & sugar, water (40-50 oz/day), seldom juice    Alcohol 1 drink/week    Dining out 1-2x/month      LABS  Recent Labs   Lab Test 03/14/24  0853   CHOL 180   HDL 59   *   TRIG 78       FBG = 92  A1c = 5.6  BP = wnl x 3     Prediction of Incident Diabetes Mellitus in Middle-aged Adults: The [place] Offspring Study  Dex Cottrell MD; James B. Meigs, MD, MPH; Ely Cotto, PhD; Vanesa Mueller MD, MPH; Bharat Vaughn MD;  Jone Gill Sr,   PhD  Pt's estimated risk for T2DM (per Table 6 above)  Pt received points for the following criteria: BMI>25  Total points: 2  8-Year estimated risk of T2DM: </= 3%    NUTRITION DIAGNOSIS  No nutrition diagnosis identified at this time.    NUTRITION INTERVENTION  Nutrition education provided:  Reviewed overall healthy diet guidelines for pre and post kidney donation. Discussed maintenance of a healthy weight and Na+ intake <3000 mg/day (<2000 mg/day if HTN).    Avoid the following post op d/t unknown effects on the organs:  - Herbal, Chinese, holistic, chiropractic, natural, alternative medicines and supplements  - Detoxes and cleanses  - Weight loss pills  - Protein powders or other products with extracts or herbs (ie green tea extract)    Patient Understanding: Pt verbalized understanding of education provided.  Expected Engagement: Good  Follow-Up Plans: PRN     NUTRITION GOALS  No nutrition goals identified at this time     Otilia Urbina RD, LD, CCTD                                  Again, thank you for allowing me to participate in the care of your patient.        Sincerely,        Otiila Urbina RD

## 2024-03-14 NOTE — PROGRESS NOTES
"Iohexol Timed Test Nursing Note  Nadia Allan comes to [facility] today for a Iohexol GFR Timed test.   Orders from Edinson Joe MD were completed.    Progress Note  Height: 5'3\"  Weight: 148lb  Age: 69  Gender: F    The following information was verified with the patient:  *Female patients are not pregnant or could not have become recently pregnant: Not applicable  *Is there a history of allergy (skin rash, swelling, ect) to :   a. Iodine (except skin reactions to Betadine): NO   b. Intravenous radio-contrast agents: YES   c. Seafood: YES     RN provided patient with educational handout regarding timed test. YES    Medication administered :   Iohexol (Omnipaque 300 mg Iodine/ ml concentration) 5 mls.  Site administered: [facility]  Start time: 0917  Stop time: 0919    Patient tolerated the procedure well    Vitals were reviewed   /81 (BP Location: Right arm, Patient Position: Sitting, Cuff Size: Adult Regular)   Pulse 89   Temp 97.8  F (36.6  C) (Oral)   Resp 18   Wt 67.2 kg (148 lb 1.6 oz)   SpO2 98%     Discharge Plan  Discharge instructions reviewed with patient: YES  Discharge papers printed and given to patient: YES  Patient/Representative verbalized understanding, all questions answered: YES    Discharged from unit at 0920 to 3rd floor SOT.    Lexus Ndiaye RN    Administrations This Visit       iohexol (OMNIPAQUE) 350 MG/ML injectable solution 4 mL       Admin Date  03/14/2024 Action  $Given Dose  4 mL Route  Intravenous Documented By  Lexus Ndiaye RN                    "

## 2024-03-14 NOTE — PROGRESS NOTES
"Psychosocial Evaluation  Living Organ Donation per OPTN Policy 14.1.A  Organ Type: Kidney  Presenting Information: Nadia presents to the [facility], [facility], [facility] Trans[facility] to complete a psychosocial evaluation since she is interested in becoming a NDD kidney donor.    PERSONAL BACKGROUND:  Current Living Situation: Nadia lives alone with her cats in her house in [place], [place].     Education/Employment/Financial Situation: Nadia is retired, former  and will not need wage reimbursement but may need travel, SW explained Donor shield program and its benefits. She denies and financial concerns.    Health Insurance Status: yes, has Medicare.    Family History: She has three brothers, one who lives in [place], [place] the others live out of state. She is a  (  in ) and has no children.     General Health: Nadia describes herself as \"pretty healthy\" but could exercise more. Denies managing any chronic health conditions or health concerns.     Mental Health: Nadia states she takes Wellbutrin and Celexa for depression which she describes has having difficulty \"getting going or getting moving sometimes, but the meds help with that\". Nadia states, \"I feel capable, even keeled and I would like more energy.\" The donor denies any past or present treatment for anxiety, bipolar disorder, or disorders of thought such as schizophrenia or schizoaffective disorder.  There is no history of personality disorder or eating disorders.  The donor denies any need to see a counselor or therapist at this time.  The donor denies any past suicidal ideation, plans, or past attempts. The donor denies any past history of hospitalization for psychiatric illnesses.  The donor denies any past history of ADHD or ADD.  The donor denies any history of educational issues or need for special educational services in their past history.     Alcohol " and Drug Use/Abuse/Dependency: Nadia reports that she consumes approximately one servings of alcohol per week ( a serving is defined here as one, 12 oz beer, or one 4 oz glass of wine, or one 1 /2 oz of hard liquor).  The donor denies any past history of abuse or dependency on alcohol or illicit drugs. The donor denies any current use of street drugs, including marijuana, vaping, edible marijuana, or other mood altering substances.  The donor denies any past history of negative consequences of use of alcohol or drugs such as a DUI, relationship problems, problems with fulfilling parenting or other care giving responsibilities, or problems with work performance.        Cigarette Use: none    Legal: none    Coping with major surgery/associated stress: Deep breathing, talking to friends.    Support System: Friends, brother    DONOR SPECIFIC INFORMATION:  Relationship to Recipient: NDD    Decision Process/Motivation to Donate: Nadia shares her  received a kidney transplant in 2008 that gave him 12 more years of life and in that time they were able to travel all over the world together. She would like to give that to someone else if she could.     High risk behaviors as defined by [place] Public Health Services (PHS) that have potential to increase risk of disease transmission were reviewed and no risk identified.     PREPARATION FOR DONATION, RECOVERY, AND POTENTIAL SHORT-LONG-TERM OUTCOMES:  Understanding of the Living Donation Process:  We discussed the role of living donor .  Short and long term medical and psychosocial risks to both, donor and recipient were reviewed and she expressed understanding.  Post surgical restrictions (2 weeks no driving, 6 weeks no lifting over 10 lbs) were reviewed and she appears capable of adhering to the post surgical requirements. The need for a caregiver was discussed and she has support from friends and neighbors. The risk of poor psychosocial outcome including  problems with body image, post-surgery depression or anxiety, or feelings of emotional distress or bereavement if recipient experiences any recurrent disease, poor outcome or death was reviewed.  Additionally, potential financial implications, including the risk of having difficulty obtaining health care insurance, life insurance, disability insurance, or long term care insurance were reviewed, as were available donor grants to assist with donor related expenses.      We also discussed some unique issues that arise with paired kidney donation, which include the uncertainty of the timing and the importance of having a employment situation and support system that is able to provide sustained support and flexibility.    She appears capable of understanding this information and making an informed medical decision.    Impressions/Recommendations:   Nadia is highly motivated to donate a kidney as NDD.  Her decision to donate is free of inducement, coercion, or other undue pressure.   Her housing, finances and employment are stable.  No current/active mental health or chemical abuse issues were identified.  The need for a caregiver was reviewed and she is able to identify a plan to meet her post operative care needs.  She appears capable of making an informed medical decision.  No psychosocial contraindications to living organ donation were identified and  I support Nadia s desire to donate a kidney as a NDD.         Contact Information:   JESSICA Romano, TRACY  Living Donor   Phone: [phone]  Pager: [phone]  [email or web address]     Time Spent: 30 minutes

## 2024-03-14 NOTE — PROGRESS NOTES
Living Kidney Donor Consent per OPTN Policy 14.2 for Independent Living Donor Advocate (VIKY)    Organ Type: living kidney donor  Presenting Information:  Nadia presents to [facility], [facility], [facility] to complete a living donor evaluation since she is interested in becoming a non directed kidney donor in honor of her .     Written assurance has been obtained from the potential donor that he/she:   Is willing to donate  Is free from inducement and coercion  Has been informed that the he/she may decline to donate at any time  Has been informed that transplant centers must:   A) Offer donors an opportunity to discontinue the donor consent or evaluation process in a way that is protected and confidential  B) Provide an independent living donor advocate (VIKY) to assist the potential donor during this process    The following was presented to the potential donor in a language in which the potential donor is able to engage in meaningful dialogue:   Education and instruction about all phases of the living donation process including:   Consent  Medical and psychosocial evaluation  Information about the surgical procedure  Pre and post operative care  Benefits of post operative follow up  Disclosure that the recovery hospital will take all reasonable precautions to provide confidentiality for the donor/recipient  Disclosure that it is a federal crime for any person to knowingly acquire, obtain or otherwise transfer any human organ for valuable consideration  Disclosure that the [facility] hospital must provide an independent living donor advocate (VIKY)  Disclosure that health information obtained during the evaluation is subject to the same regulations as all records and could reveal conditions that must be reported to local, state, or federal public health authorities  Disclosure that the [facility] hospital is required to report living donor follow up  information at 6 months, 1 year, and 2 years, and that the potential donor must commit to post operative follow up testing coordinated by the [facility]    Disclosure has been provided that these risks may be transient or permanent & include but are not limited to:  Potential psychosocial risks:  Problems with body image  Post-surgery depression or anxiety  Feelings of emotional distress or bereavement if recipient experiences any recurrent disease or in the event of the recipient s death  Impact of donation on the donor s lifestyle, such as limited ability to exercise in the short term post operative recovery period, no driving for the first 2 weeks post op or until the donor is no longer needing pain medications that impair the ability to drive.      Potential financial impacts:  Personal expenses of travel, housing, , lost wages related to donation might not be reimbursed. However, resources may be available to defray some donation-related expenses.   Need for life-long follow up at the donor s expense  Loss of employment or income  Negative impact on the ability to obtain future employment  Negative impact on the ability to obtain, maintain, or afford health, disability, and life insurance  Future health problems experienced by living donors following donation may not be covered by the recipient s insurance      PREPARATION FOR DONATION, RECOVERY, AND POTENTIAL SHORT-LONG-TERM OUTCOMES:  Understanding of the Living Donation Process:  We discussed the role of Independent Living Donor Advocate.  Short and long term medical and psychosocial risks to both, donor and recipient were reviewed and she expressed.  Post surgical restrictions (2 weeks no driving, 6 weeks no lifting over 10 lbs) were reviewed and she appears capable of adhering to the post surgical requirements. The need for a caregiver was discussed and she has supportive friends and family.  The risk of poor psychosocial outcome including  problems with body image, post-surgery depression or anxiety, or feelings of emotional distress or bereavement if recipient experiences any recurrent disease, poor outcome or death was reviewed.  Additionally, potential financial implications, including the risk of having difficulty obtaining health care insurance, life insurance, disability insurance, or long term care insurance were reviewed, as were available donor grants to assist with donor related expenses.      IMPRESSIONS/RECOMMENDATIONS:  Nadia appears highly motivated to donate a kidney as a NDD.  She appears capable of understanding this information and making an informed medical decision.  As VIKY, no concerns were identified today.  Nadia has my contact information and is aware that I am available thoughout the donation process.    Contact Information:  Bobbi Robles [facility], [facility]   Independent Living Donor Advocate  [facility], [facility]  Direct: [phone]  E-Mail: [email or web address]      Time Spent: 20 minutes

## 2024-03-14 NOTE — LETTER
"    3/14/2024         RE: Nadia Allan  [address]  [place] [address]        Dear Colleague,    Thank you for referring your patient, Nadia Allan, to the [facility]. Please see a copy of my visit note below.    Iohexol Timed Test Nursing Note  Nadia Allan comes to [facility] today for a Iohexol GFR Timed test.   Orders from Edinson Joe MD were completed.    Progress Note  Height: 5'3\"  Weight: 148lb  Age: 69  Gender: F    The following information was verified with the patient:  *Female patients are not pregnant or could not have become recently pregnant: Not applicable  *Is there a history of allergy (skin rash, swelling, ect) to :   a. Iodine (except skin reactions to Betadine): NO   b. Intravenous radio-contrast agents: YES   c. Seafood: YES     RN provided patient with educational handout regarding timed test. YES    Medication administered :   Iohexol (Omnipaque 300 mg Iodine/ ml concentration) 5 mls.  Site administered:  [facility]  Start time: 0917  Stop time: 0919    Patient tolerated the procedure well    Vitals were reviewed   /81 (BP Location: Right arm, Patient Position: Sitting, Cuff Size: Adult Regular)   Pulse 89   Temp 97.8  F (36.6  C) (Oral)   Resp 18   Wt 67.2 kg (148 lb 1.6 oz)   SpO2 98%     Discharge Plan  Discharge instructions reviewed with patient: YES  Discharge papers printed and given to patient: YES  Patient/Representative verbalized understanding, all questions answered: YES    Discharged from unit at 0920 to 3rd floor SOT.    Lexus Ndiaye RN    Administrations This Visit       iohexol (OMNIPAQUE) 350 MG/ML injectable solution 4 mL       Admin Date  03/14/2024 Action  $Given Dose  4 mL Route  Intravenous Documented By  Lexus Ndiaye RN                Again, thank you for allowing me to participate in the care of your patient.        Sincerely,        Specialty Infusion Nurse  "

## 2024-03-14 NOTE — PROGRESS NOTES
[facility] SOL[facility] ORGAN TRANSPLANT  OUTPATIENT MNT: KIDNEY DONOR EVALUATION     Current BMI: 26.9 (HT 62.2 in,  lbs/67 kg)    8 Year Estimated Risk of T2DM  </= 3%     TIME SPENT: 15 minutes  VISIT TYPE: Initial  REFERRING PHYSICIAN: Angela   PT ACCOMPANIED BY: self     NUTRITION ASSESSMENT  H/o kidney stones: none   Parental h/o DM: none  H/o GDM or HTN in pregnancy (if applicable): no    Vitamins, Supplements, Pertinent Meds: MVI, vit B12  Herbal Medicines/Supplements: none   Protein Supplement: Premier Protein (30 g) most days    Weight hx: down a bit lately w/ nutrisystem, otherwise fairly stable wt     PHYSICAL ACTIVITY   No routine   Has elliptical & lifts weights- sometimes does these things     DIET RECALL- eating 3 small meals + 2-3 snacks/day  Pt started Nutrisystem program ~1 mo ago. She has not done this before. She wanted to try it for the convenience/ease and not having to think about meal prep  Breakfast Waffles, pancakes, sausage egg muffin, oatmeal; previously would have raisin bran for BF   Lunch Pizza; previously would skip lunch   Dinner Nutrisystem meal (salmon w/ broccoli); previously would make a salad    Snacks Apple, nutrisystem bars; not a lot junk food even in the past    Beverages Coffee w/ milk & sugar, water (40-50 oz/day), seldom juice    Alcohol 1 drink/week    Dining out 1-2x/month      LABS  Recent Labs   Lab Test 03/14/24  0853   CHOL 180   HDL 59   *   TRIG 78       FBG = 92  A1c = 5.6  BP = wnl x 3     Prediction of Incident Diabetes Mellitus in Middle-aged Adults: The [place] Offspring Study  Dex Cottrell MD; James B. Meigs, MD, MPH; Ely Cotto, PhD; Vanesa Mueller MD, MPH; Bharat Vaughn MD; Jone Gill Sr,   PhD  Pt's estimated risk for T2DM (per Table 6 above)  Pt received points for the following criteria: BMI>25  Total points: 2  8-Year estimated risk of T2DM: </= 3%    NUTRITION DIAGNOSIS  No nutrition diagnosis identified at this  time.    NUTRITION INTERVENTION  Nutrition education provided:  Reviewed overall healthy diet guidelines for pre and post kidney donation. Discussed maintenance of a healthy weight and Na+ intake <3000 mg/day (<2000 mg/day if HTN).    Avoid the following post op d/t unknown effects on the organs:  - Herbal, Chinese, holistic, chiropractic, natural, alternative medicines and supplements  - Detoxes and cleanses  - Weight loss pills  - Protein powders or other products with extracts or herbs (ie green tea extract)    Patient Understanding: Pt verbalized understanding of education provided.  Expected Engagement: Good  Follow-Up Plans: PRN     NUTRITION GOALS  No nutrition goals identified at this time     Otilia Urbina, RD, LD, CCTD

## 2024-03-14 NOTE — LETTER
3/14/2024       RE: Nadia Allan  [address]  [place] [address]     Dear Colleague,    Thank you for referring your patient, Nadia Allan, to the [facility] at [facility]. Please see a copy of my visit note below.    TRANSPLANT NEPHROLOGY DONOR EVALUATION    Assessment and Plan:  # Prospective Kidney Transplant Donor: Patient with some issues that need to be addressed prior to donation. Patient's blood pressure is acceptable at this visit, kidney function appears to be possibly low with Iohexol pending, and urinalysis is abnormal.    # Low GFR: Patient with low estimated GFR and also calculated low GFR via Iohexol, which would preclude her from donating.    # Abnormal Cardiac Stress Test: Patient with abnormal positive cardiac stress echo, as well as previous cardiac echo that suggested HFpEF with grade I diastolic dysfunction.   - Recommend Cardiology referral and probably coronary angiogram.    # Chronic Back Pain with Radiculopathy and Back Surgery: Patient with both cervical and lumbar back pain with more recent bilateral finger paraesthesia.  Imaging showed cervical spondylosis with central stenosis, now s/p C2-3 laminectomy.  She reports paraesthesia is improved, but no resolved.   - Should patient be a donor candidate, would discuss with Neurosurgery whether positioning during donor nephrectomy might be a concern.    # Asymptomatic Pyuria: Patient with 8 WBC on urinalysis, but denies any UTI signs or symptoms.   - Would repeat UA.    Discussed the risks of donating a kidney, including the surgical risk and the possible risks of living with one kidney.    Education about expected post-donation kidney function and how chronic kidney disease (CKD) and end stage kidney disease (ESKD) might potentially impact the donor in the future, include, but not limited to:       - On average, donors will have 25-35% permanent loss of  kidney function at donation.       - Baseline risk of ESKD may slightly exceed that of members of the general        population with the same demographic profile.       - Donor risks must be interpreted in light of known epidemiology of both CKD or         ESKD, such as that CKD generally develops in midlife (40-50 years old) and ESKD         generally develops after age 60.       - Medical evaluation of young potential donors cannot predict lifetime risk of CKD or         ESKD.       - Donors may be at higher risk for CKD if they sustain damage to the remaining         kidney.       - Development of CKD and progression of ESKD may be more rapid with only 1         kidney.       - Some type of kidney replacement therapy, either kidney transplant or dialysis, is         required when reaching ESKD.    Potential medical or surgical risks include, but not limited to:       - Death.       - Scars, pain, fatigue, and other consequences typical of any surgical procedure.       - Decreased kidney function.       - Abdominal or bowel symptoms, such as bloating and nausea, and developing         bowel obstruction.       - Kidney failure (ESKD) and the need for a kidney transplant or dialysis for the donor.       - Impact of obesity, hypertension, or other donor-specific medical conditions on         morbidity and mortality of the potential donor.    Patients overall evaluation will be discussed with the transplant team and a final recommendation on the patients' suitability to be a kidney transplant donor will be made at that time.    Consult:  Nadia Allan was seen in consultation at the request of Dr. Jose De Jesus Miller for evaluation as a potential kidney transplant donor.    Reason for Visit:  Nadia Allan is a 69 year old female who presents for a kidney donor evaluation.  Patient would like to be a non-directed donor.    Present Condition and Donor-Related Medical History:    Patient reports her   had a kidney transplant in  and did well for many years, for which she is grateful.  Her  has since , but she wants to give back and be a non directed kidney donor.    Energy level is okay, but it comes and goes at times and she can have a hard time getting motivated.  She is active and does get some exercise.  Denies any chest pain or shortness of breath with exertion.  Appetite is good and no recent weight change.  No nausea, vomiting or diarrhea.  No fever, sweats or chills.  No leg swelling.  No pain or burning with urination.    She does have a h/o bilateral finger numbness.  This was worked up with imaging showing cervical spondylosis with central stenosis.  She is s/p C2-3 laminectomy 2023 with improvement in her numbness, but not resolved.    Patient also has a h/o rheumatic fever and now with mitral valve regurgitation.  This has reportedly been stable on cardiac echo, although her last echo 2023 did show grade I diastolic dysfunction.         Kidney Disease Hx:       h/o Kidney Problems: No   Family h/o Genetic Kidney Disease: No       h/o Hypertension: No      Usual Blood Pressure: Not checked       h/o Protein in Urine: No    h/o Blood in Urine: No       h/o Kidney Stones: No     h/o Kidney Injury: No       h/o Recurrent UTI: No   h/o Genitourinary Problems: No       h/o Chronic NSAID Use: No         Other Medical Hx:       h/o Diabetes: No             h/o Gastrointestinal, Pancreas or Liver Problems: No       h/o Lung or Heart Problems: Yes: H/o mitral valve regurgitation       h/o Hematologic Problems: No  h/o Bleeding or Clotting Problems: No       h/o Cancer: No       h/o Infection Problems: No       H/o Gestational DM: Not applicable      H/o Gestational HTN: Not applicable     H/o Preeclampsia: Not applicable         Skin Cancer Risk:       h/o more than 50 moles: No       h/o extensive sun exposure: No       h/o melanoma: No       Family h/o melanoma: No         Mental Health  Assessment:       h/o Depression: Yes: Stable with management by PCP       h/o Psychiatric Illness: No       h/o Suicidal Attempt(s): No    COVID Status:  Vaccination Up To Date: Yes  H/o COVID Infection: Yes; with no residual symptoms    Review Of Systems:   A comprehensive review of systems was obtained and negative, except as noted in the HPI or PMH.    Past Medical History:   History was taken from the patient as noted below.  Past Medical History:   Diagnosis Date     (HFpEF) heart failure with preserved ejection fraction (H)      Anxiety and depression      Arthritis      Chronic cough     Felt secondary to laryngeal/pharyngeal reflux     Dyslipidemia      Insomnia      Lumbar spondylosis      Mitral valve regurgitation      Osteopenia      Rheumatic fever      Spondylosis of cervical region without myelopathy or radiculopathy     Bilateral parasthesia in fingers, s/p C2-3 laminectomy       Past Social History:   Past Surgical History:   Procedure Laterality Date     BUNIONECTOMY Right      CERVICAL LAMINECTOMY      C2-3     FACELIFT       TONSILLECTOMY       WISDOM TOOTH EXTRACTION       Personal or family history of anesthesia problems: No    Family History:   Family History   Problem Relation Age of Onset     Coronary Artery Disease Mother      Cerebrovascular Disease Mother      Crohn's Disease Brother      Breast Cancer Maternal Aunt           Specific Family History in First Degree Relatives:       FH of Kidney Dz: No  FH of Diabetes: No       FH of Hypertension: No FH of CAD: Yes        FH of Cancer: No  FH of Kidney Cancer: No    Personal History:   Social History     Socioeconomic History     Marital status:      Spouse name: Not on file     Number of children: 0     Years of education: Not on file     Highest education level: Not on file   Occupational History     Not on file   Tobacco Use     Smoking status: Never     Smokeless tobacco: Never   Substance and Sexual Activity     Alcohol use:  Yes     Alcohol/week: 1.0 standard drink of alcohol     Types: 1 Glasses of wine per week     Comment: 1 glass of wine     Drug use: Not Currently     Sexual activity: Not on file   Other Topics Concern     Not on file   Social History Narrative     Not on file     Social Determinants of Health     Financial Resource Strain: Not on file   Food Insecurity: Not on file   Transportation Needs: Not on file   Physical Activity: Not on file   Stress: Not on file   Social Connections: Not on file   Interpersonal Safety: Not on file   Housing Stability: Not on file          Specific Social History:       Health Insurance Status: Yes       Employment Status: Retired  Occupation: [employer]                       Living Arrangements: lives alone       Social Support: Yes       Presence of increased risk for disease transmission behaviors as defined by PHS guidelines: No        Allergies:  Allergies   Allergen Reactions     Keflex [Cephalexin] Unknown     Penicillins Unknown       Medications:  Current Outpatient Medications   Medication Sig     buPROPion (WELLBUTRIN XL) 150 MG 24 hr tablet Take 450 mg by mouth every morning     calcium carbonate 500 mg, elemental, (OSCAL 500) 1250 (500 Ca) MG TABS tablet Take 500 mg by mouth daily     citalopram (CELEXA) 40 MG tablet Take 40 mg by mouth every morning     Glucos-Chondroit-Hyaluron-MSM (GLUCOSAMINE CHONDROITIN JOINT PO) Take 1 tablet by mouth 2 times daily     hydrocortisone 2.5 % cream Apply topically as needed for rash     ibuprofen (ADVIL/MOTRIN) 200 MG tablet Take 200 mg by mouth every 6 hours as needed     Multiple Vitamin (MULTI VITAMIN DAILY PO) Take 1 tablet by mouth daily     pravastatin (PRAVACHOL) 20 MG tablet Take 20 mg by mouth daily     No current facility-administered medications for this visit.     There are no discontinued medications.      Vitals:      3/14/2024     9:34 AM 3/14/2024     9:35 AM 3/14/2024     9:36 AM   Vital Signs   Systolic 105 95 102    Diastolic 71 65 68       Exam:   GENERAL APPEARANCE: alert and no distress  HENT: mouth without ulcers or lesions  RESP: lungs clear to auscultation - no rales, rhonchi or wheezes  CV: regular rhythm, normal rate, no rub, 2-6 systolic murmur and split S1S2  EDEMA: no LE edema bilaterally  ABDOMEN: soft, nondistended, nontender, bowel sounds normal  MS: extremities normal - no gross deformities noted, no evidence of inflammation in joints, no muscle tenderness  SKIN: no rash    Results:   Labs and imaging were ordered for this visit and reviewed by me.  Recent Results (from the past 336 hour(s))   EKG 12-lead complete w/read - Clinics    Collection Time: 03/14/24  7:28 AM   Result Value Ref Range    Systolic Blood Pressure  mmHg    Diastolic Blood Pressure  mmHg    Ventricular Rate 77 BPM    Atrial Rate 77 BPM    DC Interval 174 ms    QRS Duration 82 ms     ms    QTc 430 ms    P Axis 30 degrees    R AXIS 25 degrees    T Axis 28 degrees    Interpretation ECG       Sinus rhythm  Normal ECG  No previous ECGs available     EBV Capsid Antibody IgM    Collection Time: 03/14/24  8:53 AM   Result Value Ref Range    EBV Capsid Liliya IgM Instrument Value <10.0 <36.0 U/mL    EBV Capsid Antibody IgM No detectable antibody. No detectable antibody.   EBV Capsid Antibody IgG    Collection Time: 03/14/24  8:53 AM   Result Value Ref Range    EBV Capsid Liliya IgG Instrument Value >750.0 (H) <18.0 U/mL    EBV Capsid Antibody IgG Positive (A) No detectable antibody.   CMV Antibody IgG    Collection Time: 03/14/24  8:53 AM   Result Value Ref Range    CMV Liliya IgG Instrument Value 9.20 (H) <0.60 U/mL    CMV Antibody IgG Positive, suggests recent or past exposure. (A) No detectable antibody.    ABO Subtyping [[id number]]    Collection Time: 03/14/24  8:53 AM   Result Value Ref Range    A1 Antigen Type Positive     SPECIMEN EXPIRATION DATE [id number]    West Nile Virus Antibody IgG IgM    Collection Time: 03/14/24  8:53 AM   Result  Value Ref Range    West Nile IgG Serum 0.52 <=1.29 IV    West Nile IgM Serum 0.00 <=0.89 IV   Treponema Abs w Reflex to RPR and Titer    Collection Time: 03/14/24  8:53 AM   Result Value Ref Range    Treponema Antibody Total Nonreactive Nonreactive   HIV Antigen Antibody Combo Pretransplant Cascade    Collection Time: 03/14/24  8:53 AM   Result Value Ref Range    HIV Antigen Antibody Combo Pretransplant Nonreactive Nonreactive   Hepatitis C antibody    Collection Time: 03/14/24  8:53 AM   Result Value Ref Range    Hepatitis C Antibody Nonreactive Nonreactive   Hepatitis B surface antigen    Collection Time: 03/14/24  8:53 AM   Result Value Ref Range    Hepatitis B Surface Antigen Nonreactive Nonreactive   Hepatitis B Surface Antibody    Collection Time: 03/14/24  8:53 AM   Result Value Ref Range    Hepatitis B Surface Antibody Reactive     Hepatitis B Surface Antibody Instrument Value 219.00 <8.5 m[IU]/mL   Hepatitis B core antibody    Collection Time: 03/14/24  8:53 AM   Result Value Ref Range    Hepatitis B Core Antibody Total Nonreactive Nonreactive   Protein  random urine    Collection Time: 03/14/24  8:53 AM   Result Value Ref Range    Total Protein Urine mg/dL 9.9   mg/dL    Total Protein Urine mg/mg Creat 0.10 0.00 - 0.20 mg/mg Cr    Creatinine Urine mg/dL 101.0 mg/dL   Albumin Random Urine Quantitative with Creat Ratio    Collection Time: 03/14/24  8:53 AM   Result Value Ref Range    Creatinine Urine mg/dL 102.0 mg/dL    Albumin Urine mg/L <12.0 mg/L    Albumin Urine mg/g Cr     Routine UA with microscopic    Collection Time: 03/14/24  8:53 AM   Result Value Ref Range    Color Urine Yellow Colorless, Straw, Light Yellow, Yellow    Appearance Urine Clear Clear    Glucose Urine Negative Negative mg/dL    Bilirubin Urine Negative Negative    Ketones Urine Negative Negative mg/dL    Specific Gravity Urine 1.018 1.003 - 1.035    Blood Urine Negative Negative    pH Urine 5.5 5.0 - 7.0    Protein Albumin Urine  Negative Negative mg/dL    Urobilinogen Urine Normal Normal, 2.0 mg/dL    Nitrite Urine Negative Negative    Leukocyte Esterase Urine Small (A) Negative    Mucus Urine Present (A) None Seen /LPF    RBC Urine 1 <=2 /HPF    WBC Urine 8 (H) <=5 /HPF    Squamous Epithelials Urine 3 (H) <=1 /HPF   CBC with platelets    Collection Time: 03/14/24  8:53 AM   Result Value Ref Range    WBC Count 6.4 4.0 - 11.0 10e3/uL    RBC Count 4.98 3.80 - 5.20 10e6/uL    Hemoglobin 14.8 11.7 - 15.7 g/dL    Hematocrit 43.2 35.0 - 47.0 %    MCV 87 78 - 100 fL    MCH 29.7 26.5 - 33.0 pg    MCHC 34.3 31.5 - 36.5 g/dL    RDW 13.2 10.0 - 15.0 %    Platelet Count 260 150 - 450 10e3/uL   Partial thromboplastin time    Collection Time: 03/14/24  8:53 AM   Result Value Ref Range    aPTT 31 22 - 38 Seconds   INR    Collection Time: 03/14/24  8:53 AM   Result Value Ref Range    INR 1.02 0.85 - 1.15   Hemoglobin A1c    Collection Time: 03/14/24  8:53 AM   Result Value Ref Range    Hemoglobin A1C 5.6 <5.7 %   Phosphorus    Collection Time: 03/14/24  8:53 AM   Result Value Ref Range    Phosphorus 3.4 2.5 - 4.5 mg/dL   Uric acid    Collection Time: 03/14/24  8:53 AM   Result Value Ref Range    Uric Acid 4.7 2.4 - 5.7 mg/dL   Lipid Profile    Collection Time: 03/14/24  8:53 AM   Result Value Ref Range    Cholesterol 180 <200 mg/dL    Triglycerides 78 <150 mg/dL    Direct Measure HDL 59 >=50 mg/dL    LDL Cholesterol Calculated 105 (H) <=100 mg/dL    Non HDL Cholesterol 121 <130 mg/dL    Patient Fasting > 8hrs? Yes    Comprehensive metabolic panel    Collection Time: 03/14/24  8:53 AM   Result Value Ref Range    Sodium 136 135 - 145 mmol/L    Potassium 4.2 3.4 - 5.3 mmol/L    Carbon Dioxide (CO2) 24 22 - 29 mmol/L    Anion Gap 11 7 - 15 mmol/L    Urea Nitrogen 18.9 8.0 - 23.0 mg/dL    Creatinine 0.92 0.51 - 0.95 mg/dL    GFR Estimate 66 >60 mL/min/1.73m2    Calcium 9.7 8.8 - 10.2 mg/dL    Chloride 101 98 - 107 mmol/L    Glucose 92 70 - 99 mg/dL    Alkaline  Phosphatase 83 40 - 150 U/L    AST 29 0 - 45 U/L    ALT 22 0 - 50 U/L    Protein Total 7.5 6.4 - 8.3 g/dL    Albumin 4.5 3.5 - 5.2 g/dL    Bilirubin Total 0.5 <=1.2 mg/dL   Quantiferon TB Gold Plus Grey Tube    Collection Time: 03/14/24  8:53 AM    Specimen: Peripheral IV; Blood   Result Value Ref Range    Quantiferon Nil Tube 0.04 IU/mL   Quantiferon TB Gold Plus Green Tube    Collection Time: 03/14/24  8:53 AM    Specimen: Peripheral IV; Blood   Result Value Ref Range    Quantiferon TB1 Tube 0.03 IU/mL   Quantiferon TB Gold Plus Yellow Tube    Collection Time: 03/14/24  8:53 AM    Specimen: Peripheral IV; Blood   Result Value Ref Range    Quantiferon TB2 Tube 0.03    Quantiferon TB Gold Plus Purple Tube    Collection Time: 03/14/24  8:53 AM    Specimen: Peripheral IV; Blood   Result Value Ref Range    Quantiferon Mitogen 10.00 IU/mL   Adult Type and Screen    Collection Time: 03/14/24  8:53 AM   Result Value Ref Range    ABO/RH(D) A POS     Antibody Screen Negative Negative    SPECIMEN EXPIRATION DATE [id number]    Quantiferon TB Gold Plus    Collection Time: 03/14/24  8:53 AM    Specimen: Peripheral IV; Blood   Result Value Ref Range    Quantiferon-TB Gold Plus Negative Negative    TB1 Ag minus Nil Value -0.01 IU/mL    TB2 Ag minus Nil Value -0.01 IU/mL    Mitogen minus Nil Result 9.96 IU/mL    Nil Result 0.04 IU/mL   ABO and Rh 2nd type and screen required    Collection Time: 03/14/24  9:11 AM   Result Value Ref Range    ABO/RH(D) A POS     SPECIMEN EXPIRATION DATE [id number]    HLA-ABC Typing High Resolution    Collection Time: 03/14/24 10:30 AM   Result Value Ref Range    ABTEST METHOD NGS     hiresA-1 A*02:01     hiresA-1Equiv 2     hiresA-2 A*26:01     hiresA-2Equiv 26     hiresB-1 B*07:02     hiresB-1Equiv 7     hiresB-2 B*40:01     hiresB-2Equiv 60     hiresC-1 C*03:04     hiresC-1Equiv 10     hiresC-2 C*07:02     [facility]-2Equiv 7     [facility]-1 Bw*6    HLA-DR Typing High Resolution    Collection  Time: 03/14/24 10:30 AM   Result Value Ref Range    DRhiresTestM NGS     hiresDPA1-1 DPA1*01:03     hiresDPB1-1 DPB1*03:01     hiresDQA1-1 DQA1*01:02     hiresDQA1-2 DQA1*02:01     hiresDQB1-1 DQB1*02:01     hiresDQB1-1Equiv 2     hiresDQB1-2 DQB1*06:04     hiresDQB1-2Equiv 6     hiresDRB1-1 DRB1*07:01     hiresDRB1-1Equiv 7     hiresDRB1-2 DRB1*13:02     hiresDRB1-2Equiv 13     hiresDRB3-1 DRB3*03:01     hiresDRB3-1Equiv 52     hiresDRB4-2 DRB4*01:03     hiresDRB4-2Equiv 53    Iohexol    Collection Time: 03/14/24 11:17 AM   Result Value Ref Range    Iohexol Body Surface Area 1.735 m2    Iohexol Raw Clearance 69 mL/min    Iohexol Std Clearance 69 /1.73 m2    Iohexol Time 1 11.09 mg/dL    Iohexol Time 2 5.97 mg/dL   Iohexol Timed Collection 2    Collection Time: 03/14/24  1:17 PM   Result Value Ref Range    Iohexol Time 2 5.97 mg/dL             Again, thank you for allowing me to participate in the care of your patient.      Sincerely,    Edinson Joe MD

## 2024-03-14 NOTE — LETTER
"    3/14/2024         RE: Nadia Allan  [address]  [place] [address]        Dear Colleague,    Thank you for referring your patient, Nadia Allan, to the [facility] TRANSPLANT [facility]. Please see a copy of my visit note below.    Psychosocial Evaluation  Living Organ Donation per OPTN Policy 14.1.A  Organ Type: Kidney  Presenting Information: Nadia presents to the [facility], Solid Organ Transplant Clinic to complete a psychosocial evaluation since she is interested in becoming a NDD kidney donor.    PERSONAL BACKGROUND:  Current Living Situation: Nadia lives alone with her cats in her house in [place], [place].     Education/Employment/Financial Situation: Nadia is retired, former  and will not need wage reimbursement but may need travel, SW explained Donor shield program and its benefits. She denies and financial concerns.    Health Insurance Status: yes, has Medicare.    Family History: She has three brothers, one who lives in [place], [place] the others live out of state. She is a  (  in ) and has no children.     General Health: Nadia describes herself as \"pretty healthy\" but could exercise more. Denies managing any chronic health conditions or health concerns.     Mental Health: Nadia states she takes Wellbutrin and Celexa for depression which she describes has having difficulty \"getting going or getting moving sometimes, but the meds help with that\". Nadia states, \"I feel capable, even keeled and I would like more energy.\" The donor denies any past or present treatment for anxiety, bipolar disorder, or disorders of thought such as schizophrenia or schizoaffective disorder.  There is no history of personality disorder or eating disorders.  The donor denies any need to see a counselor or therapist at this time.  The donor denies any past suicidal ideation, plans, or past attempts. " The donor denies any past history of hospitalization for psychiatric illnesses.  The donor denies any past history of ADHD or ADD.  The donor denies any history of educational issues or need for special educational services in their past history.     Alcohol and Drug Use/Abuse/Dependency: Nadia reports that she consumes approximately one servings of alcohol per week ( a serving is defined here as one, 12 oz beer, or one 4 oz glass of wine, or one 1 /2 oz of hard liquor).  The donor denies any past history of abuse or dependency on alcohol or illicit drugs. The donor denies any current use of street drugs, including marijuana, vaping, edible marijuana, or other mood altering substances.  The donor denies any past history of negative consequences of use of alcohol or drugs such as a DUI, relationship problems, problems with fulfilling parenting or other care giving responsibilities, or problems with work performance.        Cigarette Use: none    Legal: none    Coping with major surgery/associated stress: Deep breathing, talking to friends.    Support System: Friends, brother    DONOR SPECIFIC INFORMATION:  Relationship to Recipient: NDD    Decision Process/Motivation to Donate: Nadia shares her  received a kidney transplant in 2008 that gave him 12 more years of life and in that time they were able to travel all over the world together. She would like to give that to someone else if she could.     High risk behaviors as defined by [place] Public Health Services (PHS) that have potential to increase risk of disease transmission were reviewed and no risk identified.     PREPARATION FOR DONATION, RECOVERY, AND POTENTIAL SHORT-LONG-TERM OUTCOMES:  Understanding of the Living Donation Process:  We discussed the role of living donor .  Short and long term medical and psychosocial risks to both, donor and recipient were reviewed and she expressed understanding.  Post surgical restrictions (2 weeks no  driving, 6 weeks no lifting over 10 lbs) were reviewed and she appears capable of adhering to the post surgical requirements. The need for a caregiver was discussed and she has support from friends and neighbors. The risk of poor psychosocial outcome including problems with body image, post-surgery depression or anxiety, or feelings of emotional distress or bereavement if recipient experiences any recurrent disease, poor outcome or death was reviewed.  Additionally, potential financial implications, including the risk of having difficulty obtaining health care insurance, life insurance, disability insurance, or long term care insurance were reviewed, as were available donor grants to assist with donor related expenses.      We also discussed some unique issues that arise with paired kidney donation, which include the uncertainty of the timing and the importance of having a employment situation and support system that is able to provide sustained support and flexibility.    She appears capable of understanding this information and making an informed medical decision.    Impressions/Recommendations:   Nadia is highly motivated to donate a kidney as NDD.  Her decision to donate is free of inducement, coercion, or other undue pressure.   Her housing, finances and employment are stable.  No current/active mental health or chemical abuse issues were identified.  The need for a caregiver was reviewed and she is able to identify a plan to meet her post operative care needs.  She appears capable of making an informed medical decision.  No psychosocial contraindications to living organ donation were identified and  I support Nadia s desire to donate a kidney as a NDD.         Contact Information:   JESSICA Romano, [facility]  Living Donor   Phone: [phone]  Pager: [phone]  [email or web address]     Time Spent: 30 minutes          Again, thank you for allowing me to participate in the care of your  patient.        Sincerely,        JESSICA Freeman

## 2024-03-14 NOTE — LETTER
3/14/2024         RE: Nadia Allan  [address]  [place] [address]        Dear Colleague,    Thank you for referring your patient, Nadia Allan, to the [facility] TRANSPLANT CLINIC. Please see a copy of my visit note below.      Transplant Surgery Consult Note    Medical record number: [id number]  YOB: 1955,   Consult requested by the patient for evaluation of kidney donation candidacy.    Assessment and Recommendations: Ms. Allan appears to be a fair candidate for kidney donation at this point in the evaluation. The following issues will need to be addressed prior to formal review:    CT abdomen and pelvis with contrast to be ordered for assessment vascular anatomy: Yes  Dietician consult ordered: Yes  Social work consult ordered: Yes  CXR and EKG ordered:  Yes  Transplant donor labs ordered to include HLA, ABOx2, Cr, Iohexol GFR or Cr clearance, virology etc.      Patient would like to be a nondirected donor. The majority of our visit today was spent in counseling regarding the medical and surgical risks of kidney donation, the typical bety-and post-operative experience and recovery/return to work pattern.  We also talked about post-op visits and longer term health care maintenance, as well as the implications of having one remaining kidney. This discussion included, but was not limited to rates of complications such as bleeding, infection, need for transfusion, reoperation, other organ injury, future bowel obstruction, incisional hernia, port site pain, varicocele, venous thrombosis, pulmonary embolism, renal failure, and death (3 per 10,000). The patient understands that if end stage renal failure happens that dialysis or transplant would be required.  At the conclusion of the visit, all questions had been answered and Ms. Allan's candidacy for donation will be reviewed at our Multidisciplinary Donor Selection Committee. She will stay in contact with the nurse  coordinator with any concerns.             We discussed long-term risks in detail.  I discussed the articles suggesting a small increase in ESKD in donors and their limitations.        We discussed recovery, including length of hospital stay; no new meds other than pain meds on discharge; limitations after surgery (no driving for a couple of weeks; no lifting over 10 lbs or exercise stretching abdominal muscles for 6 weeks; return to work [he lifts as part of his job]; and fatigue for the first few weeks postdonation.  I informed him/her of our donor survey results on donors feeling ready to drive, and on return to feeling back to normal.  We also discussed the need for maintaining a healthy lifestyle long-term after donation           Regarding nondirected donation, we discussed the option of donating directly to a recipient in our program; and the advantage would be the option to choose the date of donation. And we discussed the advantage of starting a  chain  in the national paired exchange system; and the advantage would be that multiple transplants would occur as a result of the donation (but the logistics were more complicated).  I explained how a chain was developed and worked.  In addition, I pointed out that as a  nondirected donor  there was no time pressure for him to donate; and he could choose to donate later in life.  We also discussed the fact that an NDD does not get the reward of a recipient and recipient friends and family expressing appreciation, and he should be aware of this as he makes a decision about proceeding.      As an aside, we have recently developed a risk calculator based on our long-term donor follow-up.  Our goal was to provide information to donors, donor candidates and the medical community. The calculator, available at the web site below (it loads slowly) provides a risk estimate over 40 years postdonation of developing diabetes, hypertension, proteinuria, reduced eGFR and ESRD.    We hope you find it useful,  (And please let us know if you have difficulties using it or have suggestions for improvement.)  [email or web address]    40 min spent on the date of the encounter in chart review, patient visit,  documentation and/or discussion with other providers about the issues documented above.        Jose De Jesus Miller MD  Surgical Professor, Kidney Transplantation                                                                                                    ---------------------------------------------------------------------------------------------------    HPI: Nadia Allan is a 69 year old year old female who presents for a kidney donor evaluation.        Personal history of:   No    Yes  Cancer:    []      []  Comment:     Diabetes   []      []  Comment:    Thomloretois   []      []  Comment:       Hepatitis   []      []  Comment:    Tuberculosis   []      []  Comment:   Back or neck pain:  []      []  Comment:      Kidney stones   []      []  Comment:                  Kidney infections  []      []  Comment:           Urinary retention  []      []    Comment:   Regular NSAID use:  []      []     Comment:      Constipation:   []      []        Comment:      Restoration  []      []       Comment:      Other:    []      []       Comment:         No past medical history on file.  No past surgical history on file.  No family history on file.  Social History     Socioeconomic History     Marital status:      Spouse name: Not on file     Number of children: Not on file     Years of education: Not on file     Highest education level: Not on file   Occupational History     Not on file   Tobacco Use     Smoking status: Never     Smokeless tobacco: Never   Substance and Sexual Activity     Alcohol use: Yes     Alcohol/week: 1.0 standard drink of alcohol     Types: 1 Glasses of wine per week     Comment: 1 glass of wine     Drug use: Not Currently      Sexual activity: Not on file   Other Topics Concern     Not on file   Social History Narrative     Not on file     Social Determinants of Health     Financial Resource Strain: Not on file   Food Insecurity: Not on file   Transportation Needs: Not on file   Physical Activity: Not on file   Stress: Not on file   Social Connections: Not on file   Interpersonal Safety: Not on file   Housing Stability: Not on file       ROS:   CONSTITUTIONAL:  No fevers or chills  EYES: negative for icterus  ENT:  negative for hearing loss, tinnitus and sore throat  RESPIRATORY:  negative for cough, sputum, dyspnea  CARDIOVASCULAR:  negative for chest pain  GASTROINTESTINAL:  negative for nausea, vomiting, diarrhea or constipation  GENITOURINARY:  negative for incontinence, dysuria, bladder emptying problems  HEME:  No easy bruising  INTEGUMENT:  negative for rash and pruritus  NEURO:  Negative for headache, seizure disorder    Allergies:   Allergies   Allergen Reactions     Keflex [Cephalexin] Unknown     Penicillins Unknown       Medications:  Prescription Medications as of 3/17/2024         Rx Number Disp Refills Start End Last Dispensed Date Next Fill Date Owning Pharmacy    buPROPion (WELLBUTRIN XL) 150 MG 24 hr tablet  -- -- 2/16/2024 --       Sig: Take 450 mg by mouth every morning    Class: Historical    Route: Oral    calcium carbonate 500 mg, elemental, (OSCAL 500) 1250 (500 Ca) MG TABS tablet  -- --  --       Sig: Take 500 mg by mouth daily    Class: Historical    Route: Oral    citalopram (CELEXA) 40 MG tablet  -- -- 5/16/2023 --       Sig: Take 40 mg by mouth every morning    Class: Historical    Route: Oral    Glucos-Chondroit-Hyaluron-MSM (GLUCOSAMINE CHONDROITIN JOINT PO)  -- --  --       Sig: Take 1 tablet by mouth 2 times daily    Class: Historical    Route: Oral    hydrocortisone 2.5 % cream  -- -- 10/23/2023 --       Sig: Apply topically as needed for rash    Class: Historical    Route: Topical    ibuprofen  (ADVIL/MOTRIN) 200 MG tablet  -- -- 7/18/2023 --       Sig: Take 200 mg by mouth every 6 hours as needed    Class: Historical    Route: Oral    Multiple Vitamin (MULTI VITAMIN DAILY PO)  -- --  --       Sig: Take 1 tablet by mouth daily    Class: Historical    Route: Oral    pravastatin (PRAVACHOL) 20 MG tablet  -- -- 2/16/2024 --       Sig: Take 20 mg by mouth daily    Class: Historical    Route: Oral            Exam:      Body mass index is 26.92 kg/m .  Constitutional - A&O in NAD.   Eyes - no redness or discharge.  Sclera anicteric  Respiratory - no cough, no labored breathing  Musculoskeletal - range of motion normal  Skin - no discoloration, no jaundice  Neurological - no tremors.  No facial droop or dysarthria  Psychiatric - normal mood and affect  The rest of a comprehensive physical examination is deferred due to PHE (public health emergency) video visit restrictions     Diagnostics:   Recent Results (from the past 336 hour(s))   EKG 12-lead complete w/read - Clinics    Collection Time: 03/14/24  7:28 AM   Result Value Ref Range    Systolic Blood Pressure  mmHg    Diastolic Blood Pressure  mmHg    Ventricular Rate 77 BPM    Atrial Rate 77 BPM    MO Interval 174 ms    QRS Duration 82 ms     ms    QTc 430 ms    P Axis 30 degrees    R AXIS 25 degrees    T Axis 28 degrees    Interpretation ECG       Sinus rhythm  Normal ECG  No previous ECGs available     EBV Capsid Antibody IgM    Collection Time: 03/14/24  8:53 AM   Result Value Ref Range    EBV Capsid Liliya IgM Instrument Value <10.0 <36.0 U/mL    EBV Capsid Antibody IgM No detectable antibody. No detectable antibody.   EBV Capsid Antibody IgG    Collection Time: 03/14/24  8:53 AM   Result Value Ref Range    EBV Capsid Liliya IgG Instrument Value >750.0 (H) <18.0 U/mL    EBV Capsid Antibody IgG Positive (A) No detectable antibody.   CMV Antibody IgG    Collection Time: 03/14/24  8:53 AM   Result Value Ref Range    CMV Liliya IgG Instrument Value 9.20 (H) <0.60  U/mL    CMV Antibody IgG Positive, suggests recent or past exposure. (A) No detectable antibody.    ABO Subtyping [[id number]]    Collection Time: 03/14/24  8:53 AM   Result Value Ref Range    A1 Antigen Type Positive     SPECIMEN EXPIRATION DATE [id number]    Treponema Abs w Reflex to RPR and Titer    Collection Time: 03/14/24  8:53 AM   Result Value Ref Range    Treponema Antibody Total Nonreactive Nonreactive   HIV Antigen Antibody Combo Pretransplant Cascade    Collection Time: 03/14/24  8:53 AM   Result Value Ref Range    HIV Antigen Antibody Combo Pretransplant Nonreactive Nonreactive   Hepatitis C antibody    Collection Time: 03/14/24  8:53 AM   Result Value Ref Range    Hepatitis C Antibody Nonreactive Nonreactive   Hepatitis B surface antigen    Collection Time: 03/14/24  8:53 AM   Result Value Ref Range    Hepatitis B Surface Antigen Nonreactive Nonreactive   Hepatitis B Surface Antibody    Collection Time: 03/14/24  8:53 AM   Result Value Ref Range    Hepatitis B Surface Antibody Reactive     Hepatitis B Surface Antibody Instrument Value 219.00 <8.5 m[IU]/mL   Hepatitis B core antibody    Collection Time: 03/14/24  8:53 AM   Result Value Ref Range    Hepatitis B Core Antibody Total Nonreactive Nonreactive   Protein  random urine    Collection Time: 03/14/24  8:53 AM   Result Value Ref Range    Total Protein Urine mg/dL 9.9   mg/dL    Total Protein Urine mg/mg Creat 0.10 0.00 - 0.20 mg/mg Cr    Creatinine Urine mg/dL 101.0 mg/dL   Albumin Random Urine Quantitative with Creat Ratio    Collection Time: 03/14/24  8:53 AM   Result Value Ref Range    Creatinine Urine mg/dL 102.0 mg/dL    Albumin Urine mg/L <12.0 mg/L    Albumin Urine mg/g Cr     Routine UA with microscopic    Collection Time: 03/14/24  8:53 AM   Result Value Ref Range    Color Urine Yellow Colorless, Straw, Light Yellow, Yellow    Appearance Urine Clear Clear    Glucose Urine Negative Negative mg/dL    Bilirubin Urine Negative Negative     Ketones Urine Negative Negative mg/dL    Specific Gravity Urine 1.018 1.003 - 1.035    Blood Urine Negative Negative    pH Urine 5.5 5.0 - 7.0    Protein Albumin Urine Negative Negative mg/dL    Urobilinogen Urine Normal Normal, 2.0 mg/dL    Nitrite Urine Negative Negative    Leukocyte Esterase Urine Small (A) Negative    Mucus Urine Present (A) None Seen /LPF    RBC Urine 1 <=2 /HPF    WBC Urine 8 (H) <=5 /HPF    Squamous Epithelials Urine 3 (H) <=1 /HPF   CBC with platelets    Collection Time: 03/14/24  8:53 AM   Result Value Ref Range    WBC Count 6.4 4.0 - 11.0 10e3/uL    RBC Count 4.98 3.80 - 5.20 10e6/uL    Hemoglobin 14.8 11.7 - 15.7 g/dL    Hematocrit 43.2 35.0 - 47.0 %    MCV 87 78 - 100 fL    MCH 29.7 26.5 - 33.0 pg    MCHC 34.3 31.5 - 36.5 g/dL    RDW 13.2 10.0 - 15.0 %    Platelet Count 260 150 - 450 10e3/uL   Partial thromboplastin time    Collection Time: 03/14/24  8:53 AM   Result Value Ref Range    aPTT 31 22 - 38 Seconds   INR    Collection Time: 03/14/24  8:53 AM   Result Value Ref Range    INR 1.02 0.85 - 1.15   Hemoglobin A1c    Collection Time: 03/14/24  8:53 AM   Result Value Ref Range    Hemoglobin A1C 5.6 <5.7 %   Phosphorus    Collection Time: 03/14/24  8:53 AM   Result Value Ref Range    Phosphorus 3.4 2.5 - 4.5 mg/dL   Uric acid    Collection Time: 03/14/24  8:53 AM   Result Value Ref Range    Uric Acid 4.7 2.4 - 5.7 mg/dL   Lipid Profile    Collection Time: 03/14/24  8:53 AM   Result Value Ref Range    Cholesterol 180 <200 mg/dL    Triglycerides 78 <150 mg/dL    Direct Measure HDL 59 >=50 mg/dL    LDL Cholesterol Calculated 105 (H) <=100 mg/dL    Non HDL Cholesterol 121 <130 mg/dL    Patient Fasting > 8hrs? Yes    Comprehensive metabolic panel    Collection Time: 03/14/24  8:53 AM   Result Value Ref Range    Sodium 136 135 - 145 mmol/L    Potassium 4.2 3.4 - 5.3 mmol/L    Carbon Dioxide (CO2) 24 22 - 29 mmol/L    Anion Gap 11 7 - 15 mmol/L    Urea Nitrogen 18.9 8.0 - 23.0 mg/dL     Creatinine 0.92 0.51 - 0.95 mg/dL    GFR Estimate 66 >60 mL/min/1.73m2    Calcium 9.7 8.8 - 10.2 mg/dL    Chloride 101 98 - 107 mmol/L    Glucose 92 70 - 99 mg/dL    Alkaline Phosphatase 83 40 - 150 U/L    AST 29 0 - 45 U/L    ALT 22 0 - 50 U/L    Protein Total 7.5 6.4 - 8.3 g/dL    Albumin 4.5 3.5 - 5.2 g/dL    Bilirubin Total 0.5 <=1.2 mg/dL   Quantiferon TB Gold Plus Grey Tube    Collection Time: 03/14/24  8:53 AM    Specimen: Peripheral IV; Blood   Result Value Ref Range    Quantiferon Nil Tube 0.04 IU/mL   Quantiferon TB Gold Plus Green Tube    Collection Time: 03/14/24  8:53 AM    Specimen: Peripheral IV; Blood   Result Value Ref Range    Quantiferon TB1 Tube 0.03 IU/mL   Quantiferon TB Gold Plus Yellow Tube    Collection Time: 03/14/24  8:53 AM    Specimen: Peripheral IV; Blood   Result Value Ref Range    Quantiferon TB2 Tube 0.03    Quantiferon TB Gold Plus Purple Tube    Collection Time: 03/14/24  8:53 AM    Specimen: Peripheral IV; Blood   Result Value Ref Range    Quantiferon Mitogen 10.00 IU/mL   Adult Type and Screen    Collection Time: 03/14/24  8:53 AM   Result Value Ref Range    ABO/RH(D) A POS     Antibody Screen Negative Negative    SPECIMEN EXPIRATION DATE [id number]    Quantiferon TB Gold Plus    Collection Time: 03/14/24  8:53 AM    Specimen: Peripheral IV; Blood   Result Value Ref Range    Quantiferon-TB Gold Plus Negative Negative    TB1 Ag minus Nil Value -0.01 IU/mL    TB2 Ag minus Nil Value -0.01 IU/mL    Mitogen minus Nil Result 9.96 IU/mL    Nil Result 0.04 IU/mL   ABO and Rh 2nd type and screen required    Collection Time: 03/14/24  9:11 AM   Result Value Ref Range    ABO/RH(D) A POS     SPECIMEN EXPIRATION DATE [id number]      Again, thank you for allowing me to participate in the care of your patient.        Sincerely,        Jose De Jesus Miller MD

## 2024-03-15 LAB
EBV VCA IGM SER IA-ACNC: <10 U/ML
EBV VCA IGM SER IA-ACNC: NORMAL
GAMMA INTERFERON BACKGROUND BLD IA-ACNC: 0.04 IU/ML
HBV SURFACE AG SERPL QL IA: NONREACTIVE
HCV AB SERPL QL IA: NONREACTIVE
M TB IFN-G BLD-IMP: NEGATIVE
M TB IFN-G CD4+ BCKGRND COR BLD-ACNC: 9.96 IU/ML
MITOGEN IGNF BCKGRD COR BLD-ACNC: -0.01 IU/ML
MITOGEN IGNF BCKGRD COR BLD-ACNC: -0.01 IU/ML
QUANTIFERON MITOGEN: 10 IU/ML
QUANTIFERON NIL TUBE: 0.04 IU/ML
QUANTIFERON TB1 TUBE: 0.03 IU/ML
QUANTIFERON TB2 TUBE: 0.03

## 2024-03-17 NOTE — PROGRESS NOTES
Transplant Surgery Consult Note    Medical record number: [id number]  YOB: 1955,   Consult requested by the patient for evaluation of kidney donation candidacy.    Assessment and Recommendations: Ms. Allan appears to be a fair candidate for kidney donation at this point in the evaluation. The following issues will need to be addressed prior to formal review:    CT abdomen and pelvis with contrast to be ordered for assessment vascular anatomy: Yes  Dietician consult ordered: Yes  Social work consult ordered: Yes  CXR and EKG ordered:  Yes  Transplant donor labs ordered to include HLA, ABOx2, Cr, Iohexol GFR or Cr clearance, virology etc.      Patient would like to be a nondirected donor. The majority of our visit today was spent in counseling regarding the medical and surgical risks of kidney donation, the typical bety-and post-operative experience and recovery/return to work pattern.  We also talked about post-op visits and longer term health care maintenance, as well as the implications of having one remaining kidney. This discussion included, but was not limited to rates of complications such as bleeding, infection, need for transfusion, reoperation, other organ injury, future bowel obstruction, incisional hernia, port site pain, varicocele, venous thrombosis, pulmonary embolism, renal failure, and death (3 per 10,000). The patient understands that if end stage renal failure happens that dialysis or transplant would be required.  At the conclusion of the visit, all questions had been answered and Ms. Allan's candidacy for donation will be reviewed at our Multidisciplinary Donor Selection Committee. She will stay in contact with the nurse coordinator with any concerns.             We discussed long-term risks in detail.  I discussed the articles suggesting a small increase in ESKD in donors and their limitations.        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  I informed him/her of our donor survey results on donors feeling ready to drive, and on return to feeling back to normal.  We also discussed the need for maintaining a healthy lifestyle long-term after donation           Regarding nondirected donation, we discussed the option of donating directly to a recipient in our program; and the advantage would be the option to choose the date of donation. And we discussed the advantage of starting a  chain  in the [employer] paired exchange system; and the advantage would be that multiple transplants would occur as a result of the donation (but the logistics were more complicated).  I explained how a chain was developed and worked.  In addition, I pointed out that as a  nondirected donor  there was no time pressure for him to donate; and he could choose to donate later in life.  We also discussed the fact that an NDD does not get the reward of a recipient and recipient friends and family expressing appreciation, and he should be aware of this as he makes a decision about proceeding.      As an aside, we have recently developed a risk calculator based on our long-term donor follow-up.  Our goal was to provide information to donors, donor candidates and the medical community. The calculator, available at the web site below (it loads slowly) provides a risk estimate over 40 years postdonation of developing diabetes, hypertension, proteinuria, reduced eGFR and ESRD.   We hope you find it useful,  (And please let us know if you have difficulties using it or have suggestions for improvement.)  [email or web address]    40 min spent on the date of the encounter in chart review, patient  visit,  documentation and/or discussion with other providers about the issues documented above.        Jose De Jesus Miller MD  Surgical Professor, Kidney Transplantation                                                                                                    ---------------------------------------------------------------------------------------------------    HPI: Nadia Allan is a 69 year old year old female who presents for a kidney donor evaluation.        Personal history of:   No    Yes  Cancer:    []      []  Comment:     Diabetes   []      []  Comment:    Thombosis   []      []  Comment:       Hepatitis   []      []  Comment:    Tuberculosis   []      []  Comment:   Back or neck pain:  []      []  Comment:      Kidney stones   []      []  Comment:                  Kidney infections  []      []  Comment:           Urinary retention  []      []    Comment:   Regular NSAID use:  []      []     Comment:      Constipation:   []      []        Comment:      Religious  []      []       Comment:      Other:    []      []       Comment:         No past medical history on file.  No past surgical history on file.  No family history on file.  Social History     Socioeconomic History    Marital status:      Spouse name: Not on file    Number of children: Not on file    Years of education: Not on file    Highest education level: Not on file   Occupational History    Not on file   Tobacco Use    Smoking status: Never    Smokeless tobacco: Never   Substance and Sexual Activity    Alcohol use: Yes     Alcohol/week: 1.0 standard drink of alcohol     Types: 1 Glasses of wine per week     Comment: 1 glass of wine    Drug use: Not Currently    Sexual activity: Not on file   Other Topics Concern    Not on file   Social History Narrative    Not on file     Social Determinants of Health     Financial Resource Strain: Not on file   Food Insecurity: Not on file   Transportation Needs: Not on file   Physical  Activity: Not on file   Stress: Not on file   Social Connections: Not on file   Interpersonal Safety: Not on file   Housing Stability: Not on file       ROS:   CONSTITUTIONAL:  No fevers or chills  EYES: negative for icterus  ENT:  negative for hearing loss, tinnitus and sore throat  RESPIRATORY:  negative for cough, sputum, dyspnea  CARDIOVASCULAR:  negative for chest pain  GASTROINTESTINAL:  negative for nausea, vomiting, diarrhea or constipation  GENITOURINARY:  negative for incontinence, dysuria, bladder emptying problems  HEME:  No easy bruising  INTEGUMENT:  negative for rash and pruritus  NEURO:  Negative for headache, seizure disorder    Allergies:   Allergies   Allergen Reactions    Keflex [Cephalexin] Unknown    Penicillins Unknown       Medications:  Prescription Medications as of 3/17/2024         Rx Number Disp Refills Start End Last Dispensed Date Next Fill Date Owning Pharmacy    buPROPion (WELLBUTRIN XL) 150 MG 24 hr tablet  -- -- 2/16/2024 --       Sig: Take 450 mg by mouth every morning    Class: Historical    Route: Oral    calcium carbonate 500 mg, elemental, (OSCAL 500) 1250 (500 Ca) MG TABS tablet  -- --  --       Sig: Take 500 mg by mouth daily    Class: Historical    Route: Oral    citalopram (CELEXA) 40 MG tablet  -- -- 5/16/2023 --       Sig: Take 40 mg by mouth every morning    Class: Historical    Route: Oral    Glucos-Chondroit-Hyaluron-MSM (GLUCOSAMINE CHONDROITIN JOINT PO)  -- --  --       Sig: Take 1 tablet by mouth 2 times daily    Class: Historical    Route: Oral    hydrocortisone 2.5 % cream  -- -- 10/23/2023 --       Sig: Apply topically as needed for rash    Class: Historical    Route: Topical    ibuprofen (ADVIL/MOTRIN) 200 MG tablet  -- -- 7/18/2023 --       Sig: Take 200 mg by mouth every 6 hours as needed    Class: Historical    Route: Oral    Multiple Vitamin (MULTI VITAMIN DAILY PO)  -- --  --       Sig: Take 1 tablet by mouth daily    Class: Historical    Route: Oral     pravastatin (PRAVACHOL) 20 MG tablet  -- -- 2/16/2024 --       Sig: Take 20 mg by mouth daily    Class: Historical    Route: Oral            Exam:      Body mass index is 26.92 kg/m .  Constitutional - A&O in NAD.   Eyes - no redness or discharge.  Sclera anicteric  Respiratory - no cough, no labored breathing  Musculoskeletal - range of motion normal  Skin - no discoloration, no jaundice  Neurological - no tremors.  No facial droop or dysarthria  Psychiatric - normal mood and affect  The rest of a comprehensive physical examination is deferred due to PHE (public health emergency) video visit restrictions     Diagnostics:   Recent Results (from the past 336 hour(s))   EKG 12-lead complete w/read - Clinics    Collection Time: 03/14/24  7:28 AM   Result Value Ref Range    Systolic Blood Pressure  mmHg    Diastolic Blood Pressure  mmHg    Ventricular Rate 77 BPM    Atrial Rate 77 BPM    NH Interval 174 ms    QRS Duration 82 ms     ms    QTc 430 ms    P Axis 30 degrees    R AXIS 25 degrees    T Axis 28 degrees    Interpretation ECG       Sinus rhythm  Normal ECG  No previous ECGs available     EBV Capsid Antibody IgM    Collection Time: 03/14/24  8:53 AM   Result Value Ref Range    EBV Capsid Liliya IgM Instrument Value <10.0 <36.0 U/mL    EBV Capsid Antibody IgM No detectable antibody. No detectable antibody.   EBV Capsid Antibody IgG    Collection Time: 03/14/24  8:53 AM   Result Value Ref Range    EBV Capsid Liliya IgG Instrument Value >750.0 (H) <18.0 U/mL    EBV Capsid Antibody IgG Positive (A) No detectable antibody.   CMV Antibody IgG    Collection Time: 03/14/24  8:53 AM   Result Value Ref Range    CMV Liliya IgG Instrument Value 9.20 (H) <0.60 U/mL    CMV Antibody IgG Positive, suggests recent or past exposure. (A) No detectable antibody.    ABO Subtyping [[id number]]    Collection Time: 03/14/24  8:53 AM   Result Value Ref Range    A1 Antigen Type Positive     SPECIMEN EXPIRATION DATE [id number]    Treponema Abs  w Reflex to RPR and Titer    Collection Time: 03/14/24  8:53 AM   Result Value Ref Range    Treponema Antibody Total Nonreactive Nonreactive   HIV Antigen Antibody Combo Pretransplant Cascade    Collection Time: 03/14/24  8:53 AM   Result Value Ref Range    HIV Antigen Antibody Combo Pretransplant Nonreactive Nonreactive   Hepatitis C antibody    Collection Time: 03/14/24  8:53 AM   Result Value Ref Range    Hepatitis C Antibody Nonreactive Nonreactive   Hepatitis B surface antigen    Collection Time: 03/14/24  8:53 AM   Result Value Ref Range    Hepatitis B Surface Antigen Nonreactive Nonreactive   Hepatitis B Surface Antibody    Collection Time: 03/14/24  8:53 AM   Result Value Ref Range    Hepatitis B Surface Antibody Reactive     Hepatitis B Surface Antibody Instrument Value 219.00 <8.5 m[IU]/mL   Hepatitis B core antibody    Collection Time: 03/14/24  8:53 AM   Result Value Ref Range    Hepatitis B Core Antibody Total Nonreactive Nonreactive   Protein  random urine    Collection Time: 03/14/24  8:53 AM   Result Value Ref Range    Total Protein Urine mg/dL 9.9   mg/dL    Total Protein Urine mg/mg Creat 0.10 0.00 - 0.20 mg/mg Cr    Creatinine Urine mg/dL 101.0 mg/dL   Albumin Random Urine Quantitative with Creat Ratio    Collection Time: 03/14/24  8:53 AM   Result Value Ref Range    Creatinine Urine mg/dL 102.0 mg/dL    Albumin Urine mg/L <12.0 mg/L    Albumin Urine mg/g Cr     Routine UA with microscopic    Collection Time: 03/14/24  8:53 AM   Result Value Ref Range    Color Urine Yellow Colorless, Straw, Light Yellow, Yellow    Appearance Urine Clear Clear    Glucose Urine Negative Negative mg/dL    Bilirubin Urine Negative Negative    Ketones Urine Negative Negative mg/dL    Specific Gravity Urine 1.018 1.003 - 1.035    Blood Urine Negative Negative    pH Urine 5.5 5.0 - 7.0    Protein Albumin Urine Negative Negative mg/dL    Urobilinogen Urine Normal Normal, 2.0 mg/dL    Nitrite Urine Negative Negative     Leukocyte Esterase Urine Small (A) Negative    Mucus Urine Present (A) None Seen /LPF    RBC Urine 1 <=2 /HPF    WBC Urine 8 (H) <=5 /HPF    Squamous Epithelials Urine 3 (H) <=1 /HPF   CBC with platelets    Collection Time: 03/14/24  8:53 AM   Result Value Ref Range    WBC Count 6.4 4.0 - 11.0 10e3/uL    RBC Count 4.98 3.80 - 5.20 10e6/uL    Hemoglobin 14.8 11.7 - 15.7 g/dL    Hematocrit 43.2 35.0 - 47.0 %    MCV 87 78 - 100 fL    MCH 29.7 26.5 - 33.0 pg    MCHC 34.3 31.5 - 36.5 g/dL    RDW 13.2 10.0 - 15.0 %    Platelet Count 260 150 - 450 10e3/uL   Partial thromboplastin time    Collection Time: 03/14/24  8:53 AM   Result Value Ref Range    aPTT 31 22 - 38 Seconds   INR    Collection Time: 03/14/24  8:53 AM   Result Value Ref Range    INR 1.02 0.85 - 1.15   Hemoglobin A1c    Collection Time: 03/14/24  8:53 AM   Result Value Ref Range    Hemoglobin A1C 5.6 <5.7 %   Phosphorus    Collection Time: 03/14/24  8:53 AM   Result Value Ref Range    Phosphorus 3.4 2.5 - 4.5 mg/dL   Uric acid    Collection Time: 03/14/24  8:53 AM   Result Value Ref Range    Uric Acid 4.7 2.4 - 5.7 mg/dL   Lipid Profile    Collection Time: 03/14/24  8:53 AM   Result Value Ref Range    Cholesterol 180 <200 mg/dL    Triglycerides 78 <150 mg/dL    Direct Measure HDL 59 >=50 mg/dL    LDL Cholesterol Calculated 105 (H) <=100 mg/dL    Non HDL Cholesterol 121 <130 mg/dL    Patient Fasting > 8hrs? Yes    Comprehensive metabolic panel    Collection Time: 03/14/24  8:53 AM   Result Value Ref Range    Sodium 136 135 - 145 mmol/L    Potassium 4.2 3.4 - 5.3 mmol/L    Carbon Dioxide (CO2) 24 22 - 29 mmol/L    Anion Gap 11 7 - 15 mmol/L    Urea Nitrogen 18.9 8.0 - 23.0 mg/dL    Creatinine 0.92 0.51 - 0.95 mg/dL    GFR Estimate 66 >60 mL/min/1.73m2    Calcium 9.7 8.8 - 10.2 mg/dL    Chloride 101 98 - 107 mmol/L    Glucose 92 70 - 99 mg/dL    Alkaline Phosphatase 83 40 - 150 U/L    AST 29 0 - 45 U/L    ALT 22 0 - 50 U/L    Protein Total 7.5 6.4 - 8.3 g/dL     Albumin 4.5 3.5 - 5.2 g/dL    Bilirubin Total 0.5 <=1.2 mg/dL   Quantiferon TB Gold Plus Grey Tube    Collection Time: 03/14/24  8:53 AM    Specimen: Peripheral IV; Blood   Result Value Ref Range    Quantiferon Nil Tube 0.04 IU/mL   Quantiferon TB Gold Plus Green Tube    Collection Time: 03/14/24  8:53 AM    Specimen: Peripheral IV; Blood   Result Value Ref Range    Quantiferon TB1 Tube 0.03 IU/mL   Quantiferon TB Gold Plus Yellow Tube    Collection Time: 03/14/24  8:53 AM    Specimen: Peripheral IV; Blood   Result Value Ref Range    Quantiferon TB2 Tube 0.03    Quantiferon TB Gold Plus Purple Tube    Collection Time: 03/14/24  8:53 AM    Specimen: Peripheral IV; Blood   Result Value Ref Range    Quantiferon Mitogen 10.00 IU/mL   Adult Type and Screen    Collection Time: 03/14/24  8:53 AM   Result Value Ref Range    ABO/RH(D) A POS     Antibody Screen Negative Negative    SPECIMEN EXPIRATION DATE [id number]    Quantiferon TB Gold Plus    Collection Time: 03/14/24  8:53 AM    Specimen: Peripheral IV; Blood   Result Value Ref Range    Quantiferon-TB Gold Plus Negative Negative    TB1 Ag minus Nil Value -0.01 IU/mL    TB2 Ag minus Nil Value -0.01 IU/mL    Mitogen minus Nil Result 9.96 IU/mL    Nil Result 0.04 IU/mL   ABO and Rh 2nd type and screen required    Collection Time: 03/14/24  9:11 AM   Result Value Ref Range    ABO/RH(D) A POS     SPECIMEN EXPIRATION DATE [id number]

## 2024-03-19 LAB
BSA: 1.74 M2
IOHEXOL CL UR+SERPL-VRATE: 11.09 MG/DL
IOHEXOL CL UR+SERPL-VRATE: 5.97 MG/DL
IOHEXOL CL UR+SERPL-VRATE: 5.97 MG/DL
IOHEXOL CL UR+SERPL-VRATE: 69 /1.73 M2
IOHEXOL CL UR+SERPL-VRATE: 69 ML/MIN
WNV IGG SER IA-ACNC: 0.52 IV
WNV IGM SER IA-ACNC: 0 IV

## 2024-03-20 ENCOUNTER — COMMITTEE REVIEW (OUTPATIENT)
Dept: TRANSPLANT | Facility: CLINIC | Age: 69
End: 2024-03-20
Payer: COMMERCIAL

## 2024-03-20 LAB
A*: NORMAL
A*LOCUS SEROLOGIC EQUIVALENT: 2
A*LOCUS: NORMAL
A*SEROLOGIC EQUIVALENT: 26
ABTEST METHOD: NORMAL
B*: NORMAL
B*LOCUS SEROLOGIC EQUIVALENT: 7
B*LOCUS: NORMAL
B*SEROLOGIC EQUIVALENT: 60
BW-1: NORMAL
C*: NORMAL
C*LOCUS SEROLOGIC EQUIVALENT: 10
C*LOCUS: NORMAL
C*SEROLOGIC EQUIVALENT: 7
DPA1*: NORMAL
DPB1*: NORMAL
DQA1*: NORMAL
DQA1*LOCUS: NORMAL
DQB1*: NORMAL
DQB1*LOCUS SEROLOGIC EQUIVALENT: 2
DQB1*LOCUS: NORMAL
DQB1*SEROLOGIC EQUIVALENT: 6
DRB1*: NORMAL
DRB1*LOCUS SEROLOGIC EQUIVALENT: 7
DRB1*LOCUS: NORMAL
DRB1*SEROLOGIC EQUIVALENT: 13
DRB3*LOCUS SEROLOGIC EQUIVALENT: 52
DRB3*LOCUS: NORMAL
DRB4*: NORMAL
DRB4*SEROLOGIC EQUIVALENT: 53
DRSSO TEST METHOD: NORMAL

## 2024-03-20 NOTE — COMMITTEE REVIEW
Living Donor Committee Review Note Evaluation Date: 3/14/2024  Committee Review Date: 3/20/2024    Donor being evaluated for: Kidney    Transplant Phase: Evaluation  Transplant Status: Active    Transplant Coordinator: Arlin Mueller  Transplant Surgeon:       Committee Review Members:  Independent Living Donor Advocate Lety Villarreal, [facility], Bobib Robles   Nephrology Magdy Li MD, Adali Nagy MD, Edinson Joe MD   Nutrition Otilia Urbina, [facility]   Pharmacist Slime Valente, [facility]   Transplant Maty Belkis Mora, RN, Joyce Mckinley MSW, Arlin Mueller, RN, Nadia Haro, RN, Slime Astorga, RN, Jammie Cardoso RN   Transplant Surgery Jose De Jesus Miller MD, Sahara Puente MD, MD       Transplant Eligibility:     Committee Review Decision: Declined    Relative Contraindications:     Absolute Contraindications: Measured GFR < 80cc/min.  Donor > Age 60, GFR < 75cc/min    Committee Chair Jose De Jesus Miller MD verbally attested to the committee's decision.    Committee Discussion Details:     Patient declined due to iohexol being 67, which is outside criteria. She will also need to follow up with cardiology due to abnormal echo results.     Writer called Nadia to discuss outcome of committee review and discuss recommendation of cardiology follow up. Thanked Nadia for coming forward in the first place. Nadia verbalized understanding and is in agreement with plan.

## 2024-03-23 PROBLEM — E78.5 DYSLIPIDEMIA: Status: ACTIVE | Noted: 2024-03-23

## 2024-03-23 PROBLEM — F41.9 ANXIETY AND DEPRESSION: Status: ACTIVE | Noted: 2024-03-23

## 2024-03-23 PROBLEM — I50.30 (HFPEF) HEART FAILURE WITH PRESERVED EJECTION FRACTION (H): Status: ACTIVE | Noted: 2024-03-23

## 2024-03-23 PROBLEM — M47.812 SPONDYLOSIS OF CERVICAL REGION WITHOUT MYELOPATHY OR RADICULOPATHY: Status: ACTIVE | Noted: 2024-03-23

## 2024-03-23 PROBLEM — M19.90 ARTHRITIS: Status: ACTIVE | Noted: 2024-03-23

## 2024-03-23 PROBLEM — G47.00 INSOMNIA: Status: ACTIVE | Noted: 2024-03-23

## 2024-03-23 PROBLEM — I34.0 MITRAL VALVE REGURGITATION: Status: ACTIVE | Noted: 2024-03-23

## 2024-03-23 PROBLEM — M47.816 LUMBAR SPONDYLOSIS: Status: ACTIVE | Noted: 2024-03-23

## 2024-03-23 PROBLEM — R05.3 CHRONIC COUGH: Status: ACTIVE | Noted: 2024-03-23

## 2024-03-23 PROBLEM — F32.A ANXIETY AND DEPRESSION: Status: ACTIVE | Noted: 2024-03-23

## 2024-03-23 PROBLEM — M85.80 OSTEOPENIA: Status: ACTIVE | Noted: 2024-03-23

## 2024-03-23 NOTE — PROGRESS NOTES
TRANSPLANT NEPHROLOGY DONOR EVALUATION    Assessment and Plan:  # Prospective Kidney Transplant Donor: Patient with some issues that need to be addressed prior to donation. Patient's blood pressure is acceptable at this visit, kidney function appears to be possibly low with Iohexol pending, and urinalysis is abnormal.    # Low GFR: Patient with low estimated GFR and also calculated low GFR via Iohexol, which would preclude her from donating.    # Abnormal Cardiac Stress Test: Patient with abnormal positive cardiac stress echo, as well as previous cardiac echo that suggested HFpEF with grade I diastolic dysfunction.   - Recommend Cardiology referral and probably coronary angiogram.    # Chronic Back Pain with Radiculopathy and Back Surgery: Patient with both cervical and lumbar back pain with more recent bilateral finger paraesthesia.  Imaging showed cervical spondylosis with central stenosis, now s/p C2-3 laminectomy.  She reports paraesthesia is improved, but no resolved.   - Should patient be a donor candidate, would discuss with Neurosurgery whether positioning during donor nephrectomy might be a concern.    # Asymptomatic Pyuria: Patient with 8 WBC on urinalysis, but denies any UTI signs or symptoms.   - Would repeat UA.    Discussed the risks of donating a kidney, including the surgical risk and the possible risks of living with one kidney.    Education about expected post-donation kidney function and how chronic kidney disease (CKD) and end stage kidney disease (ESKD) might potentially impact the donor in the future, include, but not limited to:       - On average, donors will have 25-35% permanent loss of kidney function at donation.       - Baseline risk of ESKD may slightly exceed that of members of the general        population with the same demographic profile.       - Donor risks must be interpreted in light of known epidemiology of both CKD or         ESKD, such as that CKD generally develops in midlife  (40-50 years old) and ESKD         generally develops after age 60.       - Medical evaluation of young potential donors cannot predict lifetime risk of CKD or         ESKD.       - Donors may be at higher risk for CKD if they sustain damage to the remaining         kidney.       - Development of CKD and progression of ESKD may be more rapid with only 1         kidney.       - Some type of kidney replacement therapy, either kidney transplant or dialysis, is         required when reaching ESKD.    Potential medical or surgical risks include, but not limited to:       - Death.       - Scars, pain, fatigue, and other consequences typical of any surgical procedure.       - Decreased kidney function.       - Abdominal or bowel symptoms, such as bloating and nausea, and developing         bowel obstruction.       - Kidney failure (ESKD) and the need for a kidney transplant or dialysis for the donor.       - Impact of obesity, hypertension, or other donor-specific medical conditions on         morbidity and mortality of the potential donor.    Patients overall evaluation will be discussed with the transplant team and a final recommendation on the patients' suitability to be a kidney transplant donor will be made at that time.    Consult:  Nadia Allan was seen in consultation at the request of Dr. Jose De Jesus Miller for evaluation as a potential kidney transplant donor.    Reason for Visit:  Nadia Allan is a 69 year old female who presents for a kidney donor evaluation.  Patient would like to be a non-directed donor.    Present Condition and Donor-Related Medical History:    Patient reports her  had a kidney transplant in  and did well for many years, for which she is grateful.  Her  has since , but she wants to give back and be a non directed kidney donor.    Energy level is okay, but it comes and goes at times and she can have a hard time getting motivated.  She is active and does get  some exercise.  Denies any chest pain or shortness of breath with exertion.  Appetite is good and no recent weight change.  No nausea, vomiting or diarrhea.  No fever, sweats or chills.  No leg swelling.  No pain or burning with urination.    She does have a h/o bilateral finger numbness.  This was worked up with imaging showing cervical spondylosis with central stenosis.  She is s/p C2-3 laminectomy 7/2023 with improvement in her numbness, but not resolved.    Patient also has a h/o rheumatic fever and now with mitral valve regurgitation.  This has reportedly been stable on cardiac echo, although her last echo 4/2023 did show grade I diastolic dysfunction.         Kidney Disease Hx:       h/o Kidney Problems: No   Family h/o Genetic Kidney Disease: No       h/o Hypertension: No      Usual Blood Pressure: Not checked       h/o Protein in Urine: No    h/o Blood in Urine: No       h/o Kidney Stones: No     h/o Kidney Injury: No       h/o Recurrent UTI: No   h/o Genitourinary Problems: No       h/o Chronic NSAID Use: No         Other Medical Hx:       h/o Diabetes: No             h/o Gastrointestinal, Pancreas or Liver Problems: No       h/o Lung or Heart Problems: Yes: H/o mitral valve regurgitation       h/o Hematologic Problems: No  h/o Bleeding or Clotting Problems: No       h/o Cancer: No       h/o Infection Problems: No       H/o Gestational DM: Not applicable      H/o Gestational HTN: Not applicable     H/o Preeclampsia: Not applicable         Skin Cancer Risk:       h/o more than 50 moles: No       h/o extensive sun exposure: No       h/o melanoma: No       Family h/o melanoma: No         Mental Health Assessment:       h/o Depression: Yes: Stable with management by PCP       h/o Psychiatric Illness: No       h/o Suicidal Attempt(s): No    COVID Status:  Vaccination Up To Date: Yes  H/o COVID Infection: Yes; with no residual symptoms    Review Of Systems:   A comprehensive review of systems was obtained and  negative, except as noted in the HPI or PMH.    Past Medical History:   History was taken from the patient as noted below.  Past Medical History:   Diagnosis Date    (HFpEF) heart failure with preserved ejection fraction (H)     Anxiety and depression     Arthritis     Chronic cough     Felt secondary to laryngeal/pharyngeal reflux    Dyslipidemia     Insomnia     Lumbar spondylosis     Mitral valve regurgitation     Osteopenia     Rheumatic fever     Spondylosis of cervical region without myelopathy or radiculopathy     Bilateral parasthesia in fingers, s/p C2-3 laminectomy       Past Social History:   Past Surgical History:   Procedure Laterality Date    BUNIONECTOMY Right     CERVICAL LAMINECTOMY      C2-3    FACELIFT      TONSILLECTOMY      WISDOM TOOTH EXTRACTION       Personal or family history of anesthesia problems: No    Family History:   Family History   Problem Relation Age of Onset    Coronary Artery Disease Mother     Cerebrovascular Disease Mother     Crohn's Disease Brother     Breast Cancer Maternal Aunt           Specific Family History in First Degree Relatives:       FH of Kidney Dz: No  FH of Diabetes: No       FH of Hypertension: No FH of CAD: Yes        FH of Cancer: No  FH of Kidney Cancer: No    Personal History:   Social History     Socioeconomic History    Marital status:      Spouse name: Not on file    Number of children: 0    Years of education: Not on file    Highest education level: Not on file   Occupational History    Not on file   Tobacco Use    Smoking status: Never    Smokeless tobacco: Never   Substance and Sexual Activity    Alcohol use: Yes     Alcohol/week: 1.0 standard drink of alcohol     Types: 1 Glasses of wine per week     Comment: 1 glass of wine    Drug use: Not Currently    Sexual activity: Not on file   Other Topics Concern    Not on file   Social History Narrative    Not on file     Social Determinants of Health     Financial Resource Strain: Not on file    Food Insecurity: Not on file   Transportation Needs: Not on file   Physical Activity: Not on file   Stress: Not on file   Social Connections: Not on file   Interpersonal Safety: Not on file   Housing Stability: Not on file          Specific Social History:       Health Insurance Status: Yes       Employment Status: Retired  Occupation: [employer]                       Living Arrangements: lives alone       Social Support: Yes       Presence of increased risk for disease transmission behaviors as defined by [facility] guidelines: No        Allergies:  Allergies   Allergen Reactions    Keflex [Cephalexin] Unknown    Penicillins Unknown       Medications:  Current Outpatient Medications   Medication Sig    buPROPion (WELLBUTRIN XL) 150 MG 24 hr tablet Take 450 mg by mouth every morning    calcium carbonate 500 mg, elemental, (OSCAL 500) 1250 (500 Ca) MG TABS tablet Take 500 mg by mouth daily    citalopram (CELEXA) 40 MG tablet Take 40 mg by mouth every morning    Glucos-Chondroit-Hyaluron-MSM (GLUCOSAMINE CHONDROITIN JOINT PO) Take 1 tablet by mouth 2 times daily    hydrocortisone 2.5 % cream Apply topically as needed for rash    ibuprofen (ADVIL/MOTRIN) 200 MG tablet Take 200 mg by mouth every 6 hours as needed    Multiple Vitamin (MULTI VITAMIN DAILY PO) Take 1 tablet by mouth daily    pravastatin (PRAVACHOL) 20 MG tablet Take 20 mg by mouth daily     No current facility-administered medications for this visit.     There are no discontinued medications.      Vitals:      3/14/2024     9:34 AM 3/14/2024     9:35 AM 3/14/2024     9:36 AM   Vital Signs   Systolic 105 95 102   Diastolic 71 65 68       Exam:   GENERAL APPEARANCE: alert and no distress  HENT: mouth without ulcers or lesions  RESP: lungs clear to auscultation - no rales, rhonchi or wheezes  CV: regular rhythm, normal rate, no rub, 2-6 systolic murmur and split S1S2  EDEMA: no LE edema bilaterally  ABDOMEN: soft, nondistended, nontender, bowel sounds normal  MS:  extremities normal - no gross deformities noted, no evidence of inflammation in joints, no muscle tenderness  SKIN: no rash    Results:   Labs and imaging were ordered for this visit and reviewed by me.  Recent Results (from the past 336 hour(s))   EKG 12-lead complete w/read - Clinics    Collection Time: 03/14/24  7:28 AM   Result Value Ref Range    Systolic Blood Pressure  mmHg    Diastolic Blood Pressure  mmHg    Ventricular Rate 77 BPM    Atrial Rate 77 BPM    VT Interval 174 ms    QRS Duration 82 ms     ms    QTc 430 ms    P Axis 30 degrees    R AXIS 25 degrees    T Axis 28 degrees    Interpretation ECG       Sinus rhythm  Normal ECG  No previous ECGs available     EBV Capsid Antibody IgM    Collection Time: 03/14/24  8:53 AM   Result Value Ref Range    EBV Capsid Liliya IgM Instrument Value <10.0 <36.0 U/mL    EBV Capsid Antibody IgM No detectable antibody. No detectable antibody.   EBV Capsid Antibody IgG    Collection Time: 03/14/24  8:53 AM   Result Value Ref Range    EBV Capsid Liliya IgG Instrument Value >750.0 (H) <18.0 U/mL    EBV Capsid Antibody IgG Positive (A) No detectable antibody.   CMV Antibody IgG    Collection Time: 03/14/24  8:53 AM   Result Value Ref Range    CMV Liliya IgG Instrument Value 9.20 (H) <0.60 U/mL    CMV Antibody IgG Positive, suggests recent or past exposure. (A) No detectable antibody.    ABO Subtyping [[id number]]    Collection Time: 03/14/24  8:53 AM   Result Value Ref Range    A1 Antigen Type Positive     SPECIMEN EXPIRATION DATE [id number]    West Nile Virus Antibody IgG IgM    Collection Time: 03/14/24  8:53 AM   Result Value Ref Range    West Nile IgG Serum 0.52 <=1.29 IV    West Nile IgM Serum 0.00 <=0.89 IV   Treponema Abs w Reflex to RPR and Titer    Collection Time: 03/14/24  8:53 AM   Result Value Ref Range    Treponema Antibody Total Nonreactive Nonreactive   HIV Antigen Antibody Combo Pretransplant Cascade    Collection Time: 03/14/24  8:53 AM   Result Value Ref  Range    HIV Antigen Antibody Combo Pretransplant Nonreactive Nonreactive   Hepatitis C antibody    Collection Time: 03/14/24  8:53 AM   Result Value Ref Range    Hepatitis C Antibody Nonreactive Nonreactive   Hepatitis B surface antigen    Collection Time: 03/14/24  8:53 AM   Result Value Ref Range    Hepatitis B Surface Antigen Nonreactive Nonreactive   Hepatitis B Surface Antibody    Collection Time: 03/14/24  8:53 AM   Result Value Ref Range    Hepatitis B Surface Antibody Reactive     Hepatitis B Surface Antibody Instrument Value 219.00 <8.5 m[IU]/mL   Hepatitis B core antibody    Collection Time: 03/14/24  8:53 AM   Result Value Ref Range    Hepatitis B Core Antibody Total Nonreactive Nonreactive   Protein  random urine    Collection Time: 03/14/24  8:53 AM   Result Value Ref Range    Total Protein Urine mg/dL 9.9   mg/dL    Total Protein Urine mg/mg Creat 0.10 0.00 - 0.20 mg/mg Cr    Creatinine Urine mg/dL 101.0 mg/dL   Albumin Random Urine Quantitative with Creat Ratio    Collection Time: 03/14/24  8:53 AM   Result Value Ref Range    Creatinine Urine mg/dL 102.0 mg/dL    Albumin Urine mg/L <12.0 mg/L    Albumin Urine mg/g Cr     Routine UA with microscopic    Collection Time: 03/14/24  8:53 AM   Result Value Ref Range    Color Urine Yellow Colorless, Straw, Light Yellow, Yellow    Appearance Urine Clear Clear    Glucose Urine Negative Negative mg/dL    Bilirubin Urine Negative Negative    Ketones Urine Negative Negative mg/dL    Specific Gravity Urine 1.018 1.003 - 1.035    Blood Urine Negative Negative    pH Urine 5.5 5.0 - 7.0    Protein Albumin Urine Negative Negative mg/dL    Urobilinogen Urine Normal Normal, 2.0 mg/dL    Nitrite Urine Negative Negative    Leukocyte Esterase Urine Small (A) Negative    Mucus Urine Present (A) None Seen /LPF    RBC Urine 1 <=2 /HPF    WBC Urine 8 (H) <=5 /HPF    Squamous Epithelials Urine 3 (H) <=1 /HPF   CBC with platelets    Collection Time: 03/14/24  8:53 AM   Result  Value Ref Range    WBC Count 6.4 4.0 - 11.0 10e3/uL    RBC Count 4.98 3.80 - 5.20 10e6/uL    Hemoglobin 14.8 11.7 - 15.7 g/dL    Hematocrit 43.2 35.0 - 47.0 %    MCV 87 78 - 100 fL    MCH 29.7 26.5 - 33.0 pg    MCHC 34.3 31.5 - 36.5 g/dL    RDW 13.2 10.0 - 15.0 %    Platelet Count 260 150 - 450 10e3/uL   Partial thromboplastin time    Collection Time: 03/14/24  8:53 AM   Result Value Ref Range    aPTT 31 22 - 38 Seconds   INR    Collection Time: 03/14/24  8:53 AM   Result Value Ref Range    INR 1.02 0.85 - 1.15   Hemoglobin A1c    Collection Time: 03/14/24  8:53 AM   Result Value Ref Range    Hemoglobin A1C 5.6 <5.7 %   Phosphorus    Collection Time: 03/14/24  8:53 AM   Result Value Ref Range    Phosphorus 3.4 2.5 - 4.5 mg/dL   Uric acid    Collection Time: 03/14/24  8:53 AM   Result Value Ref Range    Uric Acid 4.7 2.4 - 5.7 mg/dL   Lipid Profile    Collection Time: 03/14/24  8:53 AM   Result Value Ref Range    Cholesterol 180 <200 mg/dL    Triglycerides 78 <150 mg/dL    Direct Measure HDL 59 >=50 mg/dL    LDL Cholesterol Calculated 105 (H) <=100 mg/dL    Non HDL Cholesterol 121 <130 mg/dL    Patient Fasting > 8hrs? Yes    Comprehensive metabolic panel    Collection Time: 03/14/24  8:53 AM   Result Value Ref Range    Sodium 136 135 - 145 mmol/L    Potassium 4.2 3.4 - 5.3 mmol/L    Carbon Dioxide (CO2) 24 22 - 29 mmol/L    Anion Gap 11 7 - 15 mmol/L    Urea Nitrogen 18.9 8.0 - 23.0 mg/dL    Creatinine 0.92 0.51 - 0.95 mg/dL    GFR Estimate 66 >60 mL/min/1.73m2    Calcium 9.7 8.8 - 10.2 mg/dL    Chloride 101 98 - 107 mmol/L    Glucose 92 70 - 99 mg/dL    Alkaline Phosphatase 83 40 - 150 U/L    AST 29 0 - 45 U/L    ALT 22 0 - 50 U/L    Protein Total 7.5 6.4 - 8.3 g/dL    Albumin 4.5 3.5 - 5.2 g/dL    Bilirubin Total 0.5 <=1.2 mg/dL   Quantiferon TB Gold Plus Grey Tube    Collection Time: 03/14/24  8:53 AM    Specimen: Peripheral IV; Blood   Result Value Ref Range    Quantiferon Nil Tube 0.04 IU/mL   Quantiferon TB  Gold Plus Green Tube    Collection Time: 03/14/24  8:53 AM    Specimen: Peripheral IV; Blood   Result Value Ref Range    Quantiferon TB1 Tube 0.03 IU/mL   Quantiferon TB Gold Plus Yellow Tube    Collection Time: 03/14/24  8:53 AM    Specimen: Peripheral IV; Blood   Result Value Ref Range    Quantiferon TB2 Tube 0.03    Quantiferon TB Gold Plus Purple Tube    Collection Time: 03/14/24  8:53 AM    Specimen: Peripheral IV; Blood   Result Value Ref Range    Quantiferon Mitogen 10.00 IU/mL   Adult Type and Screen    Collection Time: 03/14/24  8:53 AM   Result Value Ref Range    ABO/RH(D) A POS     Antibody Screen Negative Negative    SPECIMEN EXPIRATION DATE [id number]    Quantiferon TB Gold Plus    Collection Time: 03/14/24  8:53 AM    Specimen: Peripheral IV; Blood   Result Value Ref Range    Quantiferon-TB Gold Plus Negative Negative    TB1 Ag minus Nil Value -0.01 IU/mL    TB2 Ag minus Nil Value -0.01 IU/mL    Mitogen minus Nil Result 9.96 IU/mL    Nil Result 0.04 IU/mL   ABO and Rh 2nd type and screen required    Collection Time: 03/14/24  9:11 AM   Result Value Ref Range    ABO/RH(D) A POS     SPECIMEN EXPIRATION DATE [id number]    HLA-ABC Typing High Resolution    Collection Time: 03/14/24 10:30 AM   Result Value Ref Range    ABTEST METHOD NGS     hiresA-1 A*02:01     hiresA-1Equiv 2     hiresA-2 A*26:01     hiresA-2Equiv 26     hiresB-1 B*07:02     hiresB-1Equiv 7     hiresB-2 B*40:01     hiresB-2Equiv 60     hiresC-1 C*03:04     hiresC-1Equiv 10     hiresC-2 C*07:02     hiresC-2Equiv 7     hiresBw-1 Bw*6    HLA-DR Typing High Resolution    Collection Time: 03/14/24 10:30 AM   Result Value Ref Range    DRhiresTestM NGS     hiresDPA1-1 DPA1*01:03     hiresDPB1-1 DPB1*03:01     hiresDQA1-1 DQA1*01:02     hiresDQA1-2 DQA1*02:01     hiresDQB1-1 DQB1*02:01     hiresDQB1-1Equiv 2     hiresDQB1-2 DQB1*06:04     hiresDQB1-2Equiv 6     hiresDRB1-1 DRB1*07:01     hiresDRB1-1Equiv 7     hiresDRB1-2 DRB1*13:02      hiresDRB1-2Equiv 13     hiresDRB3-1 DRB3*03:01     hiresDRB3-1Equiv 52     hiresDRB4-2 DRB4*01:03     hiresDRB4-2Equiv 53    Iohexol    Collection Time: 03/14/24 11:17 AM   Result Value Ref Range    Iohexol Body Surface Area 1.735 m2    Iohexol Raw Clearance 69 mL/min    Iohexol Std Clearance 69 /1.73 m2    Iohexol Time 1 11.09 mg/dL    Iohexol Time 2 5.97 mg/dL   Iohexol Timed Collection 2    Collection Time: 03/14/24  1:17 PM   Result Value Ref Range    Iohexol Time 2 5.97 mg/dL